# Patient Record
Sex: MALE | Race: BLACK OR AFRICAN AMERICAN | NOT HISPANIC OR LATINO | Employment: PART TIME | URBAN - METROPOLITAN AREA
[De-identification: names, ages, dates, MRNs, and addresses within clinical notes are randomized per-mention and may not be internally consistent; named-entity substitution may affect disease eponyms.]

---

## 2018-02-13 PROBLEM — K61.1 PERIRECTAL ABSCESS: Status: ACTIVE | Noted: 2018-02-13

## 2018-02-14 PROBLEM — N17.9 AKI (ACUTE KIDNEY INJURY): Status: ACTIVE | Noted: 2018-02-14

## 2018-02-14 PROBLEM — E11.9 TYPE 2 DIABETES MELLITUS WITHOUT COMPLICATION: Chronic | Status: ACTIVE | Noted: 2018-02-14

## 2018-02-15 PROBLEM — N17.9 AKI (ACUTE KIDNEY INJURY): Status: RESOLVED | Noted: 2018-02-14 | Resolved: 2018-02-15

## 2018-08-10 ENCOUNTER — HOSPITAL ENCOUNTER (EMERGENCY)
Facility: OTHER | Age: 53
Discharge: HOME | End: 2018-08-10
Attending: EMERGENCY MEDICINE
Payer: MEDICAID

## 2018-08-10 VITALS
TEMPERATURE: 99 F | WEIGHT: 191 LBS | RESPIRATION RATE: 18 BRPM | HEART RATE: 85 BPM | SYSTOLIC BLOOD PRESSURE: 151 MMHG | BODY MASS INDEX: 25.31 KG/M2 | OXYGEN SATURATION: 100 % | HEIGHT: 73 IN | DIASTOLIC BLOOD PRESSURE: 92 MMHG

## 2018-08-10 DIAGNOSIS — S61.310A LACERATION OF RIGHT INDEX FINGER WITH DAMAGE TO NAIL, FOREIGN BODY PRESENCE UNSPECIFIED, INITIAL ENCOUNTER: Primary | ICD-10-CM

## 2018-08-10 DIAGNOSIS — S62.661A CLOSED NONDISPLACED FRACTURE OF DISTAL PHALANX OF LEFT INDEX FINGER, INITIAL ENCOUNTER: ICD-10-CM

## 2018-08-10 PROCEDURE — 99283 EMERGENCY DEPT VISIT LOW MDM: CPT

## 2018-08-10 PROCEDURE — 25000003 PHARM REV CODE 250: Performed by: NURSE PRACTITIONER

## 2018-08-10 RX ORDER — HYDROCODONE BITARTRATE AND ACETAMINOPHEN 5; 325 MG/1; MG/1
1 TABLET ORAL EVERY 6 HOURS PRN
Qty: 10 TABLET | Refills: 0 | Status: SHIPPED | OUTPATIENT
Start: 2018-08-10 | End: 2021-01-28

## 2018-08-10 RX ORDER — LIDOCAINE HYDROCHLORIDE 10 MG/ML
5 INJECTION, SOLUTION EPIDURAL; INFILTRATION; INTRACAUDAL; PERINEURAL
Status: COMPLETED | OUTPATIENT
Start: 2018-08-10 | End: 2018-08-10

## 2018-08-10 RX ORDER — CEPHALEXIN 500 MG/1
500 CAPSULE ORAL 4 TIMES DAILY
Qty: 20 CAPSULE | Refills: 0 | Status: SHIPPED | OUTPATIENT
Start: 2018-08-10 | End: 2018-08-15

## 2018-08-10 RX ORDER — HYDROCODONE BITARTRATE AND ACETAMINOPHEN 5; 325 MG/1; MG/1
1 TABLET ORAL
Status: COMPLETED | OUTPATIENT
Start: 2018-08-10 | End: 2018-08-10

## 2018-08-10 RX ORDER — CEPHALEXIN 500 MG/1
500 CAPSULE ORAL
Status: COMPLETED | OUTPATIENT
Start: 2018-08-10 | End: 2018-08-10

## 2018-08-10 RX ADMIN — CEPHALEXIN 500 MG: 500 CAPSULE ORAL at 08:08

## 2018-08-10 RX ADMIN — BACITRACIN, NEOMYCIN, POLYMYXIN B 1 EACH: 400; 3.5; 5 OINTMENT TOPICAL at 08:08

## 2018-08-10 RX ADMIN — LIDOCAINE HYDROCHLORIDE 50 MG: 10 INJECTION, SOLUTION EPIDURAL; INFILTRATION; INTRACAUDAL; PERINEURAL at 07:08

## 2018-08-10 RX ADMIN — HYDROCODONE BITARTRATE AND ACETAMINOPHEN 1 TABLET: 5; 325 TABLET ORAL at 07:08

## 2018-08-11 NOTE — ED PROVIDER NOTES
Encounter Date: 8/10/2018       History     Chief Complaint   Patient presents with    Laceration     right index finger. pt was slicing vegetables at work and sliced through fingernail. Bleeding has stopped.      The patient is a 52-year-old male with a past medical history of diabetes who presents to the ED with a laceration to the tip of his right index finger through his finger nail.  Injury occurred just prior to arrival while slicing vegetables.  The patient is afebrile, non-toxic appearing, and hemodynamically stable.  Bleeding controlled at this time.  Patient is up-to-date on his tetanus vaccination.  No treatment attempted prior to arrival.      The history is provided by the patient.     Review of patient's allergies indicates:   Allergen Reactions    Shellfish containing products Hives     Past Medical History:   Diagnosis Date    Diabetes mellitus      Past Surgical History:   Procedure Laterality Date    I&D rectal abcess  02/14/2018     History reviewed. No pertinent family history.  Social History   Substance Use Topics    Smoking status: Heavy Tobacco Smoker     Packs/day: 0.50     Years: 21.00     Types: Cigarettes     Start date: 1/13/1997    Smokeless tobacco: Never Used    Alcohol use 0.6 oz/week     1 Cans of beer per week     Review of Systems   Constitutional: Negative for chills and fever.   HENT: Negative for sore throat.    Eyes: Negative for visual disturbance.   Respiratory: Negative for shortness of breath.    Cardiovascular: Negative for chest pain.   Gastrointestinal: Negative for abdominal pain, nausea and vomiting.   Genitourinary: Negative for dysuria.   Musculoskeletal: Negative for back pain and gait problem.   Skin: Positive for wound (Right index finger). Negative for rash.   Neurological: Negative for dizziness and weakness.   Hematological: Does not bruise/bleed easily.       Physical Exam     Initial Vitals [08/10/18 1855]   BP Pulse Resp Temp SpO2   129/89 95 18 98.8  °F (37.1 °C) 100 %      MAP       --         Physical Exam    Nursing note and vitals reviewed.  Constitutional: He appears well-developed and well-nourished.  Non-toxic appearance.   HENT:   Head: Normocephalic and atraumatic.   Right Ear: External ear normal.   Left Ear: External ear normal.   Nose: Nose normal.   Eyes: Conjunctivae and EOM are normal.   Neck: Full passive range of motion without pain. Neck supple.   Cardiovascular: Normal rate and normal pulses.   Pulmonary/Chest: Effort normal. No respiratory distress.   Musculoskeletal: Normal range of motion.        Right hand: He exhibits normal capillary refill. Decreased sensation is not present in the ulnar distribution, is not present in the medial distribution and is not present in the radial distribution. Right index finger: Exhibits tenderness. Injuries: laceration. There is a nailbed injury of the following fingers: index. Motor /Testing: Index Finger: 5/5.        Hands:  Neurological: He is alert and oriented to person, place, and time. He has normal strength. Gait normal.   Skin: Skin is warm and dry. Capillary refill takes less than 2 seconds. Laceration noted. No rash noted.   Psychiatric: He has a normal mood and affect. His speech is normal and behavior is normal. Judgment and thought content normal. Cognition and memory are normal.         ED Course   Procedures  Labs Reviewed - No data to display       Imaging Results    None          Medical Decision Making:   History:   Old Medical Records: I decided to obtain old medical records.  Differential Diagnosis:   Laceration  Fracture  Local infection  Neurovascular deficit  Foreign body  Clinical Tests:   Radiological Study: Ordered and Reviewed  ED Management:  Laceration noted to distal tip of right index finger with fingernail involvement.  No visible bone, tendons, ligaments noted on exam.  Range of motion and sensation are intact.  Fingernail is not loose.    Tdap up-to-date.    Wound  vigorously irrigated with normal saline and cleansed with betadine.    Radiologic evaluation demonstrates minimal cortical step-off involving the dorsal aspect of the left 2nd distal phalanx, suggestive of a nondisplaced fracture of the distal phalanx of the left index finger.  No radiopaque foreign body.    There are no signs of foreign body, neurovascular deficit, or infection at this time.  No indication for laceration repair at this time.  We will apply topical antibiotic ointment and re-dress.  In light of possible fracture and the patient's history of diabetes, the patient will be discharged on a short course of Keflex and instructed to follow up with primary care.  The patient has been given specific return precautions and referred to primary care for follow up.  Case discussed with supervising physician who agrees with plan.                      Clinical Impression:   The primary encounter diagnosis was Laceration of right index finger with damage to nail, foreign body presence unspecified, initial encounter. A diagnosis of Closed nondisplaced fracture of distal phalanx of left index finger, initial encounter was also pertinent to this visit.                             Nilda De Santiago NP  08/10/18 [id number]

## 2018-08-11 NOTE — ED TRIAGE NOTES
"Pt presents to ED with c/o laceration to left index finger. Pt states " I was cutting vegetables at work and the knife went right through my nail". No active bleeding noted upon assessment, no open wound. Slight dry drainage around nail. Pt is updated on tetanus. Pt denies loss of sensation/tingling in left hand     "

## 2021-01-28 ENCOUNTER — TELEPHONE (OUTPATIENT)
Dept: SURGERY | Facility: CLINIC | Age: 56
End: 2021-01-28

## 2021-01-28 ENCOUNTER — OFFICE VISIT (OUTPATIENT)
Dept: PRIMARY CARE CLINIC | Facility: CLINIC | Age: 56
End: 2021-01-28
Payer: MEDICAID

## 2021-01-28 ENCOUNTER — LAB VISIT (OUTPATIENT)
Dept: PRIMARY CARE CLINIC | Facility: CLINIC | Age: 56
End: 2021-01-28
Payer: MEDICAID

## 2021-01-28 VITALS
WEIGHT: 175.5 LBS | BODY MASS INDEX: 23.26 KG/M2 | OXYGEN SATURATION: 99 % | HEIGHT: 73 IN | DIASTOLIC BLOOD PRESSURE: 88 MMHG | TEMPERATURE: 98 F | HEART RATE: 83 BPM | SYSTOLIC BLOOD PRESSURE: 138 MMHG

## 2021-01-28 DIAGNOSIS — H35.61 RETINAL HEMORRHAGE OF RIGHT EYE: Primary | ICD-10-CM

## 2021-01-28 DIAGNOSIS — Z23 ENCOUNTER FOR ADMINISTRATION OF VACCINE: ICD-10-CM

## 2021-01-28 DIAGNOSIS — Z72.0 TOBACCO USE: ICD-10-CM

## 2021-01-28 DIAGNOSIS — G89.29 CHRONIC PAIN OF LEFT KNEE: ICD-10-CM

## 2021-01-28 DIAGNOSIS — Z12.11 COLON CANCER SCREENING: ICD-10-CM

## 2021-01-28 DIAGNOSIS — Z00.00 ANNUAL PHYSICAL EXAM: ICD-10-CM

## 2021-01-28 DIAGNOSIS — E11.9 TYPE 2 DIABETES MELLITUS WITHOUT COMPLICATION, WITHOUT LONG-TERM CURRENT USE OF INSULIN: ICD-10-CM

## 2021-01-28 DIAGNOSIS — M25.562 CHRONIC PAIN OF LEFT KNEE: ICD-10-CM

## 2021-01-28 LAB
ALBUMIN SERPL BCP-MCNC: 3.8 G/DL (ref 3.5–5.2)
ALP SERPL-CCNC: 85 U/L (ref 55–135)
ALT SERPL W/O P-5'-P-CCNC: 13 U/L (ref 10–44)
ANION GAP SERPL CALC-SCNC: 10 MMOL/L (ref 8–16)
AST SERPL-CCNC: 18 U/L (ref 10–40)
BASOPHILS # BLD AUTO: 0.03 K/UL (ref 0–0.2)
BASOPHILS NFR BLD: 0.6 % (ref 0–1.9)
BILIRUB SERPL-MCNC: 0.4 MG/DL (ref 0.1–1)
BUN SERPL-MCNC: 13 MG/DL (ref 6–20)
CALCIUM SERPL-MCNC: 9.5 MG/DL (ref 8.7–10.5)
CHLORIDE SERPL-SCNC: 101 MMOL/L (ref 95–110)
CHOLEST SERPL-MCNC: 171 MG/DL (ref 120–199)
CHOLEST/HDLC SERPL: 3.4 {RATIO} (ref 2–5)
CO2 SERPL-SCNC: 26 MMOL/L (ref 23–29)
CREAT SERPL-MCNC: 1.4 MG/DL (ref 0.5–1.4)
DIFFERENTIAL METHOD: ABNORMAL
EOSINOPHIL # BLD AUTO: 0.2 K/UL (ref 0–0.5)
EOSINOPHIL NFR BLD: 4 % (ref 0–8)
ERYTHROCYTE [DISTWIDTH] IN BLOOD BY AUTOMATED COUNT: 13.3 % (ref 11.5–14.5)
EST. GFR  (AFRICAN AMERICAN): >60 ML/MIN/1.73 M^2
EST. GFR  (NON AFRICAN AMERICAN): 56.6 ML/MIN/1.73 M^2
ESTIMATED AVG GLUCOSE: 237 MG/DL (ref 68–131)
GLUCOSE SERPL-MCNC: 347 MG/DL (ref 70–110)
HBA1C MFR BLD: 9.9 % (ref 4–5.6)
HCT VFR BLD AUTO: 40.6 % (ref 40–54)
HDLC SERPL-MCNC: 50 MG/DL (ref 40–75)
HDLC SERPL: 29.2 % (ref 20–50)
HGB BLD-MCNC: 13.2 G/DL (ref 14–18)
IMM GRANULOCYTES # BLD AUTO: 0.01 K/UL (ref 0–0.04)
IMM GRANULOCYTES NFR BLD AUTO: 0.2 % (ref 0–0.5)
LDLC SERPL CALC-MCNC: 99.6 MG/DL (ref 63–159)
LYMPHOCYTES # BLD AUTO: 1.6 K/UL (ref 1–4.8)
LYMPHOCYTES NFR BLD: 32.8 % (ref 18–48)
MCH RBC QN AUTO: 29.3 PG (ref 27–31)
MCHC RBC AUTO-ENTMCNC: 32.5 G/DL (ref 32–36)
MCV RBC AUTO: 90 FL (ref 82–98)
MONOCYTES # BLD AUTO: 0.3 K/UL (ref 0.3–1)
MONOCYTES NFR BLD: 6.5 % (ref 4–15)
NEUTROPHILS # BLD AUTO: 2.7 K/UL (ref 1.8–7.7)
NEUTROPHILS NFR BLD: 55.9 % (ref 38–73)
NONHDLC SERPL-MCNC: 121 MG/DL
NRBC BLD-RTO: 0 /100 WBC
PLATELET # BLD AUTO: 245 K/UL (ref 150–350)
PMV BLD AUTO: 10.6 FL (ref 9.2–12.9)
POTASSIUM SERPL-SCNC: 5 MMOL/L (ref 3.5–5.1)
PROT SERPL-MCNC: 7.7 G/DL (ref 6–8.4)
RBC # BLD AUTO: 4.51 M/UL (ref 4.6–6.2)
SODIUM SERPL-SCNC: 137 MMOL/L (ref 136–145)
TRIGL SERPL-MCNC: 107 MG/DL (ref 30–150)
WBC # BLD AUTO: 4.76 K/UL (ref 3.9–12.7)

## 2021-01-28 PROCEDURE — 36415 COLL VENOUS BLD VENIPUNCTURE: CPT

## 2021-01-28 PROCEDURE — 90686 IIV4 VACC NO PRSV 0.5 ML IM: CPT | Mod: PBBFAC,PN

## 2021-01-28 PROCEDURE — 36415 COLL VENOUS BLD VENIPUNCTURE: CPT | Mod: PBBFAC,PN | Performed by: FAMILY MEDICINE

## 2021-01-28 PROCEDURE — 99999 PR PBB SHADOW E&M-EST. PATIENT-LVL IV: ICD-10-PCS | Mod: PBBFAC,,, | Performed by: FAMILY MEDICINE

## 2021-01-28 PROCEDURE — 85025 COMPLETE CBC W/AUTO DIFF WBC: CPT

## 2021-01-28 PROCEDURE — 83036 HEMOGLOBIN GLYCOSYLATED A1C: CPT

## 2021-01-28 PROCEDURE — 90471 IMMUNIZATION ADMIN: CPT | Mod: PBBFAC,PN

## 2021-01-28 PROCEDURE — 99214 PR OFFICE/OUTPT VISIT, EST, LEVL IV, 30-39 MIN: ICD-10-PCS | Mod: S$PBB,,, | Performed by: FAMILY MEDICINE

## 2021-01-28 PROCEDURE — 99214 OFFICE O/P EST MOD 30 MIN: CPT | Mod: S$PBB,,, | Performed by: FAMILY MEDICINE

## 2021-01-28 PROCEDURE — 80053 COMPREHEN METABOLIC PANEL: CPT

## 2021-01-28 PROCEDURE — 99214 OFFICE O/P EST MOD 30 MIN: CPT | Mod: PBBFAC,PN | Performed by: FAMILY MEDICINE

## 2021-01-28 PROCEDURE — 80061 LIPID PANEL: CPT

## 2021-01-28 PROCEDURE — 99999 PR PBB SHADOW E&M-EST. PATIENT-LVL IV: CPT | Mod: PBBFAC,,, | Performed by: FAMILY MEDICINE

## 2021-01-29 DIAGNOSIS — Z12.11 SCREENING FOR COLON CANCER: Primary | ICD-10-CM

## 2021-01-29 RX ORDER — PEN NEEDLE, DIABETIC 30 GX3/16"
1 NEEDLE, DISPOSABLE MISCELLANEOUS DAILY
Qty: 100 EACH | Refills: 1 | Status: SHIPPED | OUTPATIENT
Start: 2021-01-29 | End: 2021-02-01 | Stop reason: SDUPTHER

## 2021-01-29 RX ORDER — SODIUM CHLORIDE 0.9 % (FLUSH) 0.9 %
3 SYRINGE (ML) INJECTION
Status: CANCELLED | OUTPATIENT
Start: 2021-01-29

## 2021-01-29 RX ORDER — INSULIN PUMP SYRINGE, 3 ML
EACH MISCELLANEOUS
Qty: 1 EACH | Refills: 0 | Status: SHIPPED | OUTPATIENT
Start: 2021-01-29 | End: 2021-02-01 | Stop reason: SDUPTHER

## 2021-01-29 RX ORDER — LANCETS
EACH MISCELLANEOUS
Qty: 100 EACH | Refills: 2 | Status: SHIPPED | OUTPATIENT
Start: 2021-01-29 | End: 2021-02-01 | Stop reason: SDUPTHER

## 2021-01-29 RX ORDER — SODIUM, POTASSIUM,MAG SULFATES 17.5-3.13G
1 SOLUTION, RECONSTITUTED, ORAL ORAL DAILY
Qty: 1 KIT | Refills: 0 | Status: SHIPPED | OUTPATIENT
Start: 2021-01-29 | End: 2021-01-31

## 2021-01-29 RX ORDER — INSULIN GLARGINE 100 [IU]/ML
18 INJECTION, SOLUTION SUBCUTANEOUS DAILY
Qty: 6 ML | Refills: 2 | Status: SHIPPED | OUTPATIENT
Start: 2021-01-29 | End: 2021-02-01 | Stop reason: SDUPTHER

## 2021-01-29 RX ORDER — METFORMIN HYDROCHLORIDE 500 MG/1
1000 TABLET, EXTENDED RELEASE ORAL 2 TIMES DAILY WITH MEALS
Qty: 360 TABLET | Refills: 3 | Status: SHIPPED | OUTPATIENT
Start: 2021-01-29 | End: 2021-02-01 | Stop reason: SDUPTHER

## 2021-01-29 RX ORDER — ATORVASTATIN CALCIUM 40 MG/1
40 TABLET, FILM COATED ORAL DAILY
Qty: 90 TABLET | Refills: 3 | Status: SHIPPED | OUTPATIENT
Start: 2021-01-29 | End: 2021-02-01 | Stop reason: SDUPTHER

## 2021-02-01 RX ORDER — INSULIN GLARGINE 100 [IU]/ML
18 INJECTION, SOLUTION SUBCUTANEOUS DAILY
Qty: 6 ML | Refills: 2 | Status: SHIPPED | OUTPATIENT
Start: 2021-02-01 | End: 2021-04-29

## 2021-02-01 RX ORDER — LANCETS
EACH MISCELLANEOUS
Qty: 100 EACH | Refills: 2 | Status: SHIPPED | OUTPATIENT
Start: 2021-02-01

## 2021-02-01 RX ORDER — METFORMIN HYDROCHLORIDE 500 MG/1
1000 TABLET, EXTENDED RELEASE ORAL 2 TIMES DAILY WITH MEALS
Qty: 360 TABLET | Refills: 3 | Status: SHIPPED | OUTPATIENT
Start: 2021-02-01 | End: 2021-09-16 | Stop reason: SDUPTHER

## 2021-02-01 RX ORDER — INSULIN PUMP SYRINGE, 3 ML
EACH MISCELLANEOUS
Qty: 1 EACH | Refills: 0 | Status: SHIPPED | OUTPATIENT
Start: 2021-02-01 | End: 2022-02-01

## 2021-02-01 RX ORDER — PEN NEEDLE, DIABETIC 30 GX3/16"
1 NEEDLE, DISPOSABLE MISCELLANEOUS DAILY
Qty: 100 EACH | Refills: 1 | Status: SHIPPED | OUTPATIENT
Start: 2021-02-01 | End: 2021-09-16 | Stop reason: SDUPTHER

## 2021-02-01 RX ORDER — ATORVASTATIN CALCIUM 40 MG/1
40 TABLET, FILM COATED ORAL DAILY
Qty: 90 TABLET | Refills: 3 | Status: SHIPPED | OUTPATIENT
Start: 2021-02-01 | End: 2021-09-16 | Stop reason: SDUPTHER

## 2021-02-08 ENCOUNTER — TELEPHONE (OUTPATIENT)
Dept: PRIMARY CARE CLINIC | Facility: CLINIC | Age: 56
End: 2021-02-08

## 2021-02-25 ENCOUNTER — TELEPHONE (OUTPATIENT)
Dept: ORTHOPEDICS | Facility: CLINIC | Age: 56
End: 2021-02-25

## 2021-02-26 ENCOUNTER — TELEPHONE (OUTPATIENT)
Dept: ORTHOPEDICS | Facility: CLINIC | Age: 56
End: 2021-02-26

## 2021-03-08 ENCOUNTER — TELEPHONE (OUTPATIENT)
Dept: PRIMARY CARE CLINIC | Facility: CLINIC | Age: 56
End: 2021-03-08

## 2021-03-09 ENCOUNTER — CLINICAL SUPPORT (OUTPATIENT)
Dept: PRIMARY CARE CLINIC | Facility: CLINIC | Age: 56
End: 2021-03-09
Payer: MEDICAID

## 2021-03-09 DIAGNOSIS — E11.9 TYPE 2 DIABETES MELLITUS WITHOUT COMPLICATION, WITHOUT LONG-TERM CURRENT USE OF INSULIN: Primary | ICD-10-CM

## 2021-03-11 DIAGNOSIS — Z12.11 SCREENING FOR MALIGNANT NEOPLASM OF COLON: Primary | ICD-10-CM

## 2021-03-31 RX ORDER — LANCETS 30 GAUGE
EACH MISCELLANEOUS 3 TIMES DAILY
COMMUNITY
Start: 2021-02-01 | End: 2021-09-24 | Stop reason: SDUPTHER

## 2021-03-31 RX ORDER — PENICILLIN V POTASSIUM 500 MG/1
500 TABLET, FILM COATED ORAL EVERY 6 HOURS
COMMUNITY
Start: 2021-03-16 | End: 2021-09-16

## 2021-04-29 ENCOUNTER — TELEPHONE (OUTPATIENT)
Dept: PRIMARY CARE CLINIC | Facility: CLINIC | Age: 56
End: 2021-04-29

## 2021-04-29 DIAGNOSIS — E11.9 TYPE 2 DIABETES MELLITUS WITHOUT COMPLICATION, WITHOUT LONG-TERM CURRENT USE OF INSULIN: Primary | ICD-10-CM

## 2021-04-29 RX ORDER — INSULIN GLARGINE 100 [IU]/ML
INJECTION, SOLUTION SUBCUTANEOUS
Qty: 6 ML | Refills: 2 | Status: SHIPPED | OUTPATIENT
Start: 2021-04-29 | End: 2021-08-17

## 2021-06-10 ENCOUNTER — PATIENT OUTREACH (OUTPATIENT)
Dept: ADMINISTRATIVE | Facility: HOSPITAL | Age: 56
End: 2021-06-10

## 2021-06-24 ENCOUNTER — OFFICE VISIT (OUTPATIENT)
Dept: PRIMARY CARE CLINIC | Facility: CLINIC | Age: 56
End: 2021-06-24
Payer: MEDICAID

## 2021-06-24 ENCOUNTER — LAB VISIT (OUTPATIENT)
Dept: PRIMARY CARE CLINIC | Facility: CLINIC | Age: 56
End: 2021-06-24
Payer: MEDICAID

## 2021-06-24 VITALS
OXYGEN SATURATION: 96 % | SYSTOLIC BLOOD PRESSURE: 152 MMHG | DIASTOLIC BLOOD PRESSURE: 90 MMHG | WEIGHT: 172.5 LBS | HEIGHT: 73 IN | HEART RATE: 88 BPM | BODY MASS INDEX: 22.86 KG/M2

## 2021-06-24 DIAGNOSIS — E11.9 TYPE 2 DIABETES MELLITUS WITHOUT COMPLICATION, WITHOUT LONG-TERM CURRENT USE OF INSULIN: Primary | ICD-10-CM

## 2021-06-24 DIAGNOSIS — E11.9 TYPE 2 DIABETES MELLITUS WITHOUT COMPLICATION, UNSPECIFIED WHETHER LONG TERM INSULIN USE: ICD-10-CM

## 2021-06-24 DIAGNOSIS — M54.50 ACUTE RIGHT-SIDED LOW BACK PAIN WITHOUT SCIATICA: ICD-10-CM

## 2021-06-24 DIAGNOSIS — R03.0 ELEVATED BLOOD PRESSURE READING WITHOUT DIAGNOSIS OF HYPERTENSION: ICD-10-CM

## 2021-06-24 DIAGNOSIS — E78.5 HYPERLIPIDEMIA, UNSPECIFIED HYPERLIPIDEMIA TYPE: ICD-10-CM

## 2021-06-24 DIAGNOSIS — E11.9 TYPE 2 DIABETES MELLITUS WITHOUT COMPLICATION: ICD-10-CM

## 2021-06-24 DIAGNOSIS — E11.9 TYPE 2 DIABETES MELLITUS WITHOUT COMPLICATION, WITHOUT LONG-TERM CURRENT USE OF INSULIN: ICD-10-CM

## 2021-06-24 DIAGNOSIS — N52.9 ERECTILE DYSFUNCTION, UNSPECIFIED ERECTILE DYSFUNCTION TYPE: ICD-10-CM

## 2021-06-24 PROCEDURE — 99999 PR PBB SHADOW E&M-EST. PATIENT-LVL IV: ICD-10-PCS | Mod: PBBFAC,,, | Performed by: FAMILY MEDICINE

## 2021-06-24 PROCEDURE — 99214 OFFICE O/P EST MOD 30 MIN: CPT | Mod: PBBFAC,PN | Performed by: FAMILY MEDICINE

## 2021-06-24 PROCEDURE — 99999 PR PBB SHADOW E&M-EST. PATIENT-LVL IV: CPT | Mod: PBBFAC,,, | Performed by: FAMILY MEDICINE

## 2021-06-24 PROCEDURE — 80061 LIPID PANEL: CPT | Performed by: FAMILY MEDICINE

## 2021-06-24 PROCEDURE — 99214 OFFICE O/P EST MOD 30 MIN: CPT | Mod: S$PBB,,, | Performed by: FAMILY MEDICINE

## 2021-06-24 PROCEDURE — 99214 PR OFFICE/OUTPT VISIT, EST, LEVL IV, 30-39 MIN: ICD-10-PCS | Mod: S$PBB,,, | Performed by: FAMILY MEDICINE

## 2021-06-24 PROCEDURE — 83036 HEMOGLOBIN GLYCOSYLATED A1C: CPT | Performed by: FAMILY MEDICINE

## 2021-06-24 PROCEDURE — 36415 COLL VENOUS BLD VENIPUNCTURE: CPT | Mod: PBBFAC,PN

## 2021-06-24 RX ORDER — NEBULIZER AND COMPRESSOR
1 EACH MISCELLANEOUS DAILY
Qty: 1 EACH | Refills: 0 | Status: SHIPPED | OUTPATIENT
Start: 2021-06-24

## 2021-06-24 RX ORDER — NAPROXEN 500 MG/1
500 TABLET ORAL 2 TIMES DAILY PRN
Qty: 30 TABLET | Refills: 1 | Status: SHIPPED | OUTPATIENT
Start: 2021-06-24

## 2021-06-24 RX ORDER — SILDENAFIL 50 MG/1
50 TABLET, FILM COATED ORAL DAILY PRN
Qty: 30 TABLET | Refills: 3 | Status: SHIPPED | OUTPATIENT
Start: 2021-06-24

## 2021-06-25 LAB
CHOLEST SERPL-MCNC: 141 MG/DL (ref 120–199)
CHOLEST/HDLC SERPL: 3.1 {RATIO} (ref 2–5)
ESTIMATED AVG GLUCOSE: 220 MG/DL (ref 68–131)
HBA1C MFR BLD: 9.3 % (ref 4–5.6)
HDLC SERPL-MCNC: 46 MG/DL (ref 40–75)
HDLC SERPL: 32.6 % (ref 20–50)
LDLC SERPL CALC-MCNC: 79.2 MG/DL (ref 63–159)
NONHDLC SERPL-MCNC: 95 MG/DL
TRIGL SERPL-MCNC: 79 MG/DL (ref 30–150)

## 2021-07-01 ENCOUNTER — CLINICAL SUPPORT (OUTPATIENT)
Dept: PRIMARY CARE CLINIC | Facility: CLINIC | Age: 56
End: 2021-07-01
Payer: MEDICAID

## 2021-07-01 VITALS — HEART RATE: 75 BPM | DIASTOLIC BLOOD PRESSURE: 90 MMHG | SYSTOLIC BLOOD PRESSURE: 140 MMHG | OXYGEN SATURATION: 99 %

## 2021-07-01 DIAGNOSIS — I10 ESSENTIAL HYPERTENSION: Primary | ICD-10-CM

## 2021-07-01 PROCEDURE — 99212 OFFICE O/P EST SF 10 MIN: CPT | Mod: PBBFAC,PN

## 2021-07-01 PROCEDURE — 99999 PR PBB SHADOW E&M-EST. PATIENT-LVL II: CPT | Mod: PBBFAC,,,

## 2021-07-01 PROCEDURE — 99999 PR PBB SHADOW E&M-EST. PATIENT-LVL II: ICD-10-PCS | Mod: PBBFAC,,,

## 2021-07-01 RX ORDER — AMLODIPINE BESYLATE 5 MG/1
5 TABLET ORAL DAILY
Qty: 90 TABLET | Refills: 0 | Status: SHIPPED | OUTPATIENT
Start: 2021-07-01 | End: 2021-09-16 | Stop reason: SDUPTHER

## 2021-07-07 ENCOUNTER — PATIENT OUTREACH (OUTPATIENT)
Dept: ADMINISTRATIVE | Facility: HOSPITAL | Age: 56
End: 2021-07-07

## 2021-09-16 ENCOUNTER — TELEPHONE (OUTPATIENT)
Dept: PRIMARY CARE CLINIC | Facility: CLINIC | Age: 56
End: 2021-09-16

## 2021-09-16 RX ORDER — INSULIN GLARGINE 100 [IU]/ML
INJECTION, SOLUTION SUBCUTANEOUS
Qty: 6 ML | Refills: 2 | Status: SHIPPED | OUTPATIENT
Start: 2021-09-16

## 2021-09-16 RX ORDER — PEN NEEDLE, DIABETIC 30 GX3/16"
1 NEEDLE, DISPOSABLE MISCELLANEOUS DAILY
Qty: 100 EACH | Refills: 1 | Status: SHIPPED | OUTPATIENT
Start: 2021-09-16

## 2021-09-16 RX ORDER — ATORVASTATIN CALCIUM 40 MG/1
40 TABLET, FILM COATED ORAL NIGHTLY
Qty: 90 TABLET | Refills: 3 | Status: SHIPPED | OUTPATIENT
Start: 2021-09-16 | End: 2022-09-16

## 2021-09-16 RX ORDER — METFORMIN HYDROCHLORIDE 500 MG/1
1000 TABLET, EXTENDED RELEASE ORAL 2 TIMES DAILY WITH MEALS
Qty: 180 TABLET | Refills: 6 | Status: SHIPPED | OUTPATIENT
Start: 2021-09-16

## 2021-09-16 RX ORDER — AMLODIPINE BESYLATE 5 MG/1
5 TABLET ORAL DAILY
Qty: 90 TABLET | Refills: 0 | Status: SHIPPED | OUTPATIENT
Start: 2021-09-16 | End: 2021-12-20

## 2021-09-24 ENCOUNTER — TELEPHONE (OUTPATIENT)
Dept: PRIMARY CARE CLINIC | Facility: CLINIC | Age: 56
End: 2021-09-24

## 2021-09-24 DIAGNOSIS — E11.9 TYPE 2 DIABETES MELLITUS WITHOUT COMPLICATION, WITHOUT LONG-TERM CURRENT USE OF INSULIN: Primary | ICD-10-CM

## 2021-09-24 RX ORDER — LANCETS 30 GAUGE
1 EACH MISCELLANEOUS 3 TIMES DAILY
Qty: 1 EACH | Refills: 0 | Status: SHIPPED | OUTPATIENT
Start: 2021-09-24

## 2021-10-06 DIAGNOSIS — E11.9 TYPE 2 DIABETES MELLITUS WITHOUT COMPLICATION: ICD-10-CM

## 2022-03-15 ENCOUNTER — PATIENT OUTREACH (OUTPATIENT)
Dept: ADMINISTRATIVE | Facility: HOSPITAL | Age: 57
End: 2022-03-15
Payer: MEDICAID

## 2024-04-25 ENCOUNTER — OFFICE VISIT (OUTPATIENT)
Dept: URGENT CARE | Facility: CLINIC | Age: 59
End: 2024-04-25
Payer: COMMERCIAL

## 2024-04-25 VITALS
DIASTOLIC BLOOD PRESSURE: 111 MMHG | TEMPERATURE: 99 F | SYSTOLIC BLOOD PRESSURE: 178 MMHG | RESPIRATION RATE: 20 BRPM | WEIGHT: 172 LBS | HEART RATE: 105 BPM | OXYGEN SATURATION: 100 % | HEIGHT: 73 IN | BODY MASS INDEX: 22.8 KG/M2

## 2024-04-25 DIAGNOSIS — I10 UNCONTROLLED HYPERTENSION: Primary | ICD-10-CM

## 2024-04-25 DIAGNOSIS — R07.89 CHEST WALL PAIN: ICD-10-CM

## 2024-04-25 DIAGNOSIS — E11.65 UNCONTROLLED TYPE 2 DIABETES MELLITUS WITH HYPERGLYCEMIA: ICD-10-CM

## 2024-04-25 LAB
BILIRUB UR QL STRIP: NEGATIVE
GLUCOSE SERPL-MCNC: 198 MG/DL (ref 70–110)
GLUCOSE UR QL STRIP: POSITIVE
KETONES UR QL STRIP: NEGATIVE
LEUKOCYTE ESTERASE UR QL STRIP: NEGATIVE
OHS QRS DURATION: 86 MS
OHS QTC CALCULATION: 437 MS
PH, POC UA: 5.5 (ref 5–8)
POC BLOOD, URINE: NEGATIVE
POC NITRATES, URINE: NEGATIVE
PROT UR QL STRIP: POSITIVE
SP GR UR STRIP: 1.02 (ref 1–1.03)
UROBILINOGEN UR STRIP-ACNC: ABNORMAL (ref 0.3–2.2)

## 2024-04-25 PROCEDURE — 93010 ELECTROCARDIOGRAM REPORT: CPT | Mod: S$GLB,,, | Performed by: INTERNAL MEDICINE

## 2024-04-25 PROCEDURE — 82962 GLUCOSE BLOOD TEST: CPT | Mod: S$GLB,,,

## 2024-04-25 PROCEDURE — 99204 OFFICE O/P NEW MOD 45 MIN: CPT | Mod: S$GLB,,,

## 2024-04-25 PROCEDURE — 93005 ELECTROCARDIOGRAM TRACING: CPT | Mod: S$GLB,,,

## 2024-04-25 PROCEDURE — 81003 URINALYSIS AUTO W/O SCOPE: CPT | Mod: QW,S$GLB,,

## 2024-04-25 RX ORDER — AMLODIPINE BESYLATE 5 MG/1
5 TABLET ORAL DAILY
Qty: 30 TABLET | Refills: 0 | Status: SHIPPED | OUTPATIENT
Start: 2024-04-25 | End: 2024-04-29 | Stop reason: SDUPTHER

## 2024-04-25 NOTE — PATIENT INSTRUCTIONS
Appointment with internal medicine scheduled for Monday, April 29, 2024 at 10:00 AM at [facility] location on [facility].     Amlodipine 5 mg sent to pharmacy. This is a blood pressure medication. Please start taking as prescribed.   Keep follow up appointment with [facility] next Wednesday for continued management of your arm fracture.  Continue current treatment regimen for your fracture as directed by previous provider.    Go to the ED if  You have signs of a heart attack, which may include:  Severe chest pain, pressure, or discomfort with:  Breathing trouble, sweating, upset stomach, or cold, clammy skin  Pain in your arms, back, or jaw  Worse pain with activity like walking up stairs  Fast or irregular heartbeat  Feeling dizzy, faint, or weak  You have signs of stroke like sudden:  Numbness or weakness of the face, arm, or leg, especially on one side of the body  Confusion, trouble speaking, or understanding  Trouble seeing in one or both eyes  Trouble walking, dizziness, loss of balance, or coordination  Severe headache with no known cause  You have a seizure or pass out.  You have a severe headache with an upset stomach or throwing up.  You have sudden, severe back pain.  You have 2 home blood pressure readings higher than 180/120.  Your urine is brown or bloody.  You have signs of high blood sugar like you:  Are more thirsty  Are urinating more often  Have new shortness of breath  Have belly pain, an upset stomach, or are throwing up.  Are more tired than normal  Have a very dry mouth or a fruity breath odor  Signs of infection. These include a fever of 100.4°F (38°C) or higher, chills, or a wound that will not heal.  Blurred vision  Chest pain  Swelling in your legs  Change in color and odor of your feet  Blood sugar that remains high and does not respond to treatment  Should you develop any worsening or new symptoms after leaving urgent care, it is recommended that you go to the ER for  further/repeat evaluation.   Follow up with your PCP in 3-5 days after your urgent care visit.  Please remember that you have received care at an urgent care today. Urgent cares are not emergency rooms and are not equipped to handle life threatening emergencies and cannot rule in or out certain medical conditions and you may be released before all of your medical problems are known or treated, please schedule all follow up appointments as discussed and if you have worsening symptoms please go to the ER to rule out potential life threatening problems, as discussed.
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
## 2024-04-29 ENCOUNTER — LAB VISIT (OUTPATIENT)
Dept: LAB | Facility: HOSPITAL | Age: 59
End: 2024-04-29
Attending: STUDENT IN AN ORGANIZED HEALTH CARE EDUCATION/TRAINING PROGRAM
Payer: COMMERCIAL

## 2024-04-29 ENCOUNTER — OFFICE VISIT (OUTPATIENT)
Dept: PRIMARY CARE CLINIC | Facility: CLINIC | Age: 59
End: 2024-04-29
Payer: COMMERCIAL

## 2024-04-29 VITALS
SYSTOLIC BLOOD PRESSURE: 136 MMHG | BODY MASS INDEX: 20.83 KG/M2 | RESPIRATION RATE: 16 BRPM | TEMPERATURE: 98 F | WEIGHT: 157.19 LBS | OXYGEN SATURATION: 99 % | HEIGHT: 73 IN | HEART RATE: 104 BPM | DIASTOLIC BLOOD PRESSURE: 90 MMHG

## 2024-04-29 DIAGNOSIS — I10 UNCONTROLLED HYPERTENSION: ICD-10-CM

## 2024-04-29 DIAGNOSIS — Z79.4 TYPE 2 DIABETES MELLITUS WITHOUT COMPLICATION, WITH LONG-TERM CURRENT USE OF INSULIN: ICD-10-CM

## 2024-04-29 DIAGNOSIS — Z00.00 ANNUAL PHYSICAL EXAM: ICD-10-CM

## 2024-04-29 DIAGNOSIS — E11.9 TYPE 2 DIABETES MELLITUS WITHOUT COMPLICATION, WITH LONG-TERM CURRENT USE OF INSULIN: ICD-10-CM

## 2024-04-29 DIAGNOSIS — Z00.00 ANNUAL PHYSICAL EXAM: Primary | ICD-10-CM

## 2024-04-29 LAB
ALBUMIN SERPL BCP-MCNC: 3.6 G/DL (ref 3.5–5.2)
ALP SERPL-CCNC: 126 U/L (ref 55–135)
ALT SERPL W/O P-5'-P-CCNC: 23 U/L (ref 10–44)
ANION GAP SERPL CALC-SCNC: 13 MMOL/L (ref 8–16)
AST SERPL-CCNC: 30 U/L (ref 10–40)
BASOPHILS # BLD AUTO: 0.02 K/UL (ref 0–0.2)
BASOPHILS NFR BLD: 0.3 % (ref 0–1.9)
BILIRUB SERPL-MCNC: 0.7 MG/DL (ref 0.1–1)
BUN SERPL-MCNC: 23 MG/DL (ref 6–20)
CALCIUM SERPL-MCNC: 9.9 MG/DL (ref 8.7–10.5)
CHLORIDE SERPL-SCNC: 96 MMOL/L (ref 95–110)
CHOLEST SERPL-MCNC: 178 MG/DL (ref 120–199)
CHOLEST/HDLC SERPL: 3.6 {RATIO} (ref 2–5)
CO2 SERPL-SCNC: 24 MMOL/L (ref 23–29)
COMPLEXED PSA SERPL-MCNC: 1.7 NG/ML (ref 0–4)
CREAT SERPL-MCNC: 1.6 MG/DL (ref 0.5–1.4)
DIFFERENTIAL METHOD BLD: ABNORMAL
EOSINOPHIL # BLD AUTO: 0.2 K/UL (ref 0–0.5)
EOSINOPHIL NFR BLD: 2.5 % (ref 0–8)
ERYTHROCYTE [DISTWIDTH] IN BLOOD BY AUTOMATED COUNT: 12.5 % (ref 11.5–14.5)
EST. GFR  (NO RACE VARIABLE): 49.9 ML/MIN/1.73 M^2
ESTIMATED AVG GLUCOSE: 194 MG/DL (ref 68–131)
GLUCOSE SERPL-MCNC: 240 MG/DL (ref 70–110)
HBA1C MFR BLD: 8.4 % (ref 4–5.6)
HCT VFR BLD AUTO: 40.5 % (ref 40–54)
HDLC SERPL-MCNC: 49 MG/DL (ref 40–75)
HDLC SERPL: 27.5 % (ref 20–50)
HGB BLD-MCNC: 13.8 G/DL (ref 14–18)
IMM GRANULOCYTES # BLD AUTO: 0.02 K/UL (ref 0–0.04)
IMM GRANULOCYTES NFR BLD AUTO: 0.3 % (ref 0–0.5)
LDLC SERPL CALC-MCNC: 113.8 MG/DL (ref 63–159)
LYMPHOCYTES # BLD AUTO: 1.4 K/UL (ref 1–4.8)
LYMPHOCYTES NFR BLD: 21.4 % (ref 18–48)
MCH RBC QN AUTO: 30.4 PG (ref 27–31)
MCHC RBC AUTO-ENTMCNC: 34.1 G/DL (ref 32–36)
MCV RBC AUTO: 89 FL (ref 82–98)
MONOCYTES # BLD AUTO: 0.6 K/UL (ref 0.3–1)
MONOCYTES NFR BLD: 8.8 % (ref 4–15)
NEUTROPHILS # BLD AUTO: 4.3 K/UL (ref 1.8–7.7)
NEUTROPHILS NFR BLD: 66.7 % (ref 38–73)
NONHDLC SERPL-MCNC: 129 MG/DL
NRBC BLD-RTO: 0 /100 WBC
PLATELET # BLD AUTO: 319 K/UL (ref 150–450)
PMV BLD AUTO: 10 FL (ref 9.2–12.9)
POTASSIUM SERPL-SCNC: 4.7 MMOL/L (ref 3.5–5.1)
PROT SERPL-MCNC: 8.4 G/DL (ref 6–8.4)
RBC # BLD AUTO: 4.54 M/UL (ref 4.6–6.2)
SODIUM SERPL-SCNC: 133 MMOL/L (ref 136–145)
TRIGL SERPL-MCNC: 76 MG/DL (ref 30–150)
TSH SERPL DL<=0.005 MIU/L-ACNC: 3.16 UIU/ML (ref 0.4–4)
WBC # BLD AUTO: 6.37 K/UL (ref 3.9–12.7)

## 2024-04-29 PROCEDURE — 80053 COMPREHEN METABOLIC PANEL: CPT | Performed by: STUDENT IN AN ORGANIZED HEALTH CARE EDUCATION/TRAINING PROGRAM

## 2024-04-29 PROCEDURE — 3075F SYST BP GE 130 - 139MM HG: CPT | Mod: CPTII,S$GLB,, | Performed by: STUDENT IN AN ORGANIZED HEALTH CARE EDUCATION/TRAINING PROGRAM

## 2024-04-29 PROCEDURE — 80061 LIPID PANEL: CPT | Performed by: STUDENT IN AN ORGANIZED HEALTH CARE EDUCATION/TRAINING PROGRAM

## 2024-04-29 PROCEDURE — 84443 ASSAY THYROID STIM HORMONE: CPT | Performed by: STUDENT IN AN ORGANIZED HEALTH CARE EDUCATION/TRAINING PROGRAM

## 2024-04-29 PROCEDURE — 3008F BODY MASS INDEX DOCD: CPT | Mod: CPTII,S$GLB,, | Performed by: STUDENT IN AN ORGANIZED HEALTH CARE EDUCATION/TRAINING PROGRAM

## 2024-04-29 PROCEDURE — 85025 COMPLETE CBC W/AUTO DIFF WBC: CPT | Performed by: STUDENT IN AN ORGANIZED HEALTH CARE EDUCATION/TRAINING PROGRAM

## 2024-04-29 PROCEDURE — 3080F DIAST BP >= 90 MM HG: CPT | Mod: CPTII,S$GLB,, | Performed by: STUDENT IN AN ORGANIZED HEALTH CARE EDUCATION/TRAINING PROGRAM

## 2024-04-29 PROCEDURE — 84153 ASSAY OF PSA TOTAL: CPT | Performed by: STUDENT IN AN ORGANIZED HEALTH CARE EDUCATION/TRAINING PROGRAM

## 2024-04-29 PROCEDURE — 1159F MED LIST DOCD IN RCRD: CPT | Mod: CPTII,S$GLB,, | Performed by: STUDENT IN AN ORGANIZED HEALTH CARE EDUCATION/TRAINING PROGRAM

## 2024-04-29 PROCEDURE — 99999 PR PBB SHADOW E&M-EST. PATIENT-LVL IV: CPT | Mod: PBBFAC,,, | Performed by: STUDENT IN AN ORGANIZED HEALTH CARE EDUCATION/TRAINING PROGRAM

## 2024-04-29 PROCEDURE — 1160F RVW MEDS BY RX/DR IN RCRD: CPT | Mod: CPTII,S$GLB,, | Performed by: STUDENT IN AN ORGANIZED HEALTH CARE EDUCATION/TRAINING PROGRAM

## 2024-04-29 PROCEDURE — 36415 COLL VENOUS BLD VENIPUNCTURE: CPT | Mod: PN | Performed by: STUDENT IN AN ORGANIZED HEALTH CARE EDUCATION/TRAINING PROGRAM

## 2024-04-29 PROCEDURE — 99386 PREV VISIT NEW AGE 40-64: CPT | Mod: S$GLB,,, | Performed by: STUDENT IN AN ORGANIZED HEALTH CARE EDUCATION/TRAINING PROGRAM

## 2024-04-29 PROCEDURE — 83036 HEMOGLOBIN GLYCOSYLATED A1C: CPT | Performed by: STUDENT IN AN ORGANIZED HEALTH CARE EDUCATION/TRAINING PROGRAM

## 2024-04-29 RX ORDER — NAPROXEN 500 MG/1
500 TABLET ORAL 2 TIMES DAILY PRN
Qty: 30 TABLET | Refills: 1 | Status: SHIPPED | OUTPATIENT
Start: 2024-04-29

## 2024-04-29 RX ORDER — METHOCARBAMOL 750 MG/1
750 TABLET, FILM COATED ORAL
COMMUNITY

## 2024-04-29 RX ORDER — LANCETS
EACH MISCELLANEOUS
Qty: 100 EACH | Refills: 0 | Status: SHIPPED | OUTPATIENT
Start: 2024-04-29

## 2024-04-29 RX ORDER — OXYCODONE HYDROCHLORIDE 5 MG/1
5 TABLET ORAL EVERY 6 HOURS PRN
COMMUNITY
Start: 2024-04-21

## 2024-04-29 RX ORDER — INSULIN GLARGINE 100 [IU]/ML
12 INJECTION, SOLUTION SUBCUTANEOUS NIGHTLY
Qty: 6 ML | Refills: 2 | Status: SHIPPED | OUTPATIENT
Start: 2024-04-29 | End: 2025-04-29

## 2024-04-29 RX ORDER — METFORMIN HYDROCHLORIDE 500 MG/1
1000 TABLET, EXTENDED RELEASE ORAL 2 TIMES DAILY WITH MEALS
Qty: 180 TABLET | Refills: 6 | Status: SHIPPED | OUTPATIENT
Start: 2024-04-29 | End: 2024-05-17

## 2024-04-29 RX ORDER — AMLODIPINE BESYLATE 5 MG/1
5 TABLET ORAL DAILY
Qty: 90 TABLET | Refills: 0 | Status: SHIPPED | OUTPATIENT
Start: 2024-04-29 | End: 2024-05-17

## 2024-04-29 RX ORDER — INSULIN PUMP SYRINGE, 3 ML
EACH MISCELLANEOUS
Qty: 1 EACH | Refills: 0 | Status: SHIPPED | OUTPATIENT
Start: 2024-04-29 | End: 2025-04-29

## 2024-04-29 RX ORDER — ATORVASTATIN CALCIUM 40 MG/1
40 TABLET, FILM COATED ORAL NIGHTLY
Qty: 90 TABLET | Refills: 0 | Status: SHIPPED | OUTPATIENT
Start: 2024-04-29 | End: 2024-05-17 | Stop reason: SDUPTHER

## 2024-04-29 NOTE — PROGRESS NOTES
Office visit  Patient: Kelvin Canela   4/29/2024     Assessment:     1. Annual physical exam    2. Uncontrolled hypertension    3. Type 2 diabetes mellitus without complication, with long-term current use of insulin      Plan:       1. Annual physical exam  -     TSH; Future; Expected date: 04/29/2024  -     Hemoglobin A1C; Future; Expected date: 04/29/2024  -     Lipid Panel; Future; Expected date: 04/29/2024  -     Comprehensive Metabolic Panel; Future; Expected date: 04/29/2024  -     CBC Auto Differential; Future; Expected date: 04/29/2024  -     PSA, SCREENING; Future; Expected date: 04/29/2024       -Discussed healthy habits and recommended preventative screening    2. Uncontrolled hypertension  -     amLODIPine (NORVASC) 5 MG tablet; Take 1 tablet (5 mg total) by mouth once daily.  Dispense: 90 tablet; Refill: 0        -improved in office compared to at Urgent Care; should go down to normal with amlodipine    3. Type 2 diabetes mellitus without complication, with long-term current use of insulin  -     blood-glucose meter kit; To check BG 1 time daily, to use with insurance preferred meter  Dispense: 1 each; Refill: 0  -     lancets Misc; To check BG 1 time daily, to use with insurance preferred meter  Dispense: 100 each; Refill: 0  -     blood sugar diagnostic Strp; To check BG 1 times daily, to use with insurance preferred meter  Dispense: 100 each; Refill: 2         -was previously on insulin glargine 18 units nightly, but as patient has been off insulin for quite some time, will start at lower dose of 12; patient counseled to check his blood sugar for 3-4 days in a row and then send me the values to determine whether we need to start at an even lower dose of insulin glargine  -     empagliflozin (JARDIANCE) 10 mg tablet; Take 1 tablet (10 mg total) by mouth once daily.  Dispense: 90 tablet; Refill: 0  -     metFORMIN (GLUCOPHAGE-XR) 500 MG ER 24hr tablet; Take 2 tablets (1,000 mg total) by mouth 2 (two)  times daily with meals.  Dispense: 180 tablet; Refill: 6  -     insulin (BASAGLAR KWIKPEN U-100 INSULIN) glargine 100 units/mL SubQ pen; Inject 12 Units into the skin every evening.  Dispense: 6 mL; Refill: 2    Return to clinic in 1 month or sooner as needed.            CHIEF COMPLAINT: annual physical    HPI: Kelvin Canela is a 58 y.o. male who presents for an annual physical. He hasn't seen a primary care doctor in years.    He reports he had a colonoscopy in [place] in 2018.  He reports no polyps, so he is next due in 2028.  He reports he has not been checking his blood glucose at home and doesn't have a meter.  He has not been taking any of his blood pressure or diabetes meds.      Current Outpatient Medications   Medication Instructions    amLODIPine (NORVASC) 5 MG tablet TAKE 1 TABLET(5 MG) BY MOUTH EVERY DAY FOR HIGH BLOOD PRESSURE    amLODIPine (NORVASC) 5 mg, Oral, Daily    atorvastatin (LIPITOR) 40 mg, Oral, Nightly, For cholesterol    BASAGLAR KWIKPEN U-100 INSULIN 12 Units, Subcutaneous, Nightly    BLOOD PRESSURE CUFF Misc 1 each, Misc.(Non-Drug; Combo Route), Daily    blood sugar diagnostic Strp To check BG 3 times daily, to use with insurance preferred meter    blood sugar diagnostic Strp To check BG 1 times daily, to use with insurance preferred meter    blood-glucose meter kit To check BG 1 time daily, to use with insurance preferred meter    empagliflozin (JARDIANCE) 10 mg, Oral, Daily    lancets Misc To check BG 3 times daily, to use with insurance preferred meter    lancets Misc To check BG 1 time daily, to use with insurance preferred meter    metFORMIN (GLUCOPHAGE-XR) 1,000 mg, Oral, 2 times daily with meals    methocarbamoL (ROBAXIN) 750 mg, Oral    naproxen (NAPROSYN) 500 mg, Oral, 2 times daily PRN    ONETOUCH ULTRA2 METER Misc 1 each, Subcutaneous, 3 times daily, Use to test blood glucose    oxyCODONE (ROXICODONE) 5 mg, Oral, Every 6 hours PRN    pen needle, diabetic (PEN NEEDLE) 31 gauge x  "5/16" Ndle 1 each, Misc.(Non-Drug; Combo Route), Daily    sildenafiL (VIAGRA) 50 mg, Oral, Daily PRN, 50 mg once daily as needed 1 hour before sexual activity; may be taken up to 4 hours before sexual activity. Reduce to 25 mg once daily if side effects occur       Lab Results   Component Value Date    HGBA1C 9.3 (H) 06/24/2021    HGBA1C 9.9 (H) 01/28/2021     No results found for: "MICALBCREAT"  Lab Results   Component Value Date    LDLCALC 79.2 06/24/2021    LDLCALC 99.6 01/28/2021    CHOL 141 06/24/2021    HDL 46 06/24/2021    TRIG 79 06/24/2021       Lab Results   Component Value Date     01/28/2021    K 5.0 01/28/2021     01/28/2021    CO2 26 01/28/2021     (H) 01/28/2021    BUN 13 01/28/2021    CREATININE 1.4 01/28/2021    CALCIUM 9.5 01/28/2021    PROT 7.7 01/28/2021    ALBUMIN 3.8 01/28/2021    BILITOT 0.4 01/28/2021    ALKPHOS 85 01/28/2021    AST 18 01/28/2021    ALT 13 01/28/2021    ANIONGAP 10 01/28/2021    ESTGFRAFRICA >60.0 01/28/2021    EGFRNONAA 56.6 (A) 01/28/2021    WBC 4.76 01/28/2021    HGB 13.2 (L) 01/28/2021    HGB 10.2 (L) 02/15/2018    HCT 40.6 01/28/2021    MCV 90 01/28/2021     01/28/2021       No results found for: "LH", "FSH", "TOTALTESTOST", "PROGESTERONE", "ESTRADIOL", "[id number]", "[id number]", "FERRITIN", "IRON", "TRANSFERRIN", "TIBC", "FESATURATED", "ZINC"      Past Medical History:   Diagnosis Date    Diabetes mellitus     Diabetes mellitus, type 2      Past Surgical History:   Procedure Laterality Date    I&D rectal abcess  02/14/2018     Vitals:    04/29/24 1011 04/29/24 1040   BP: (!) 140/90 (!) 136/90   Pulse: 104    Resp: 16    Temp: 98.4 °F (36.9 °C)    TempSrc: Oral    SpO2: 99%    Weight: 71.3 kg (157 lb 3 oz)    Height: 6' 1" (1.854 m)    PainSc:   3    PainLoc: Arm      Objective:   Physical Exam  Constitutional:       General: He is not in acute distress.     Appearance: Normal appearance. He is well-developed.   HENT:      Head: Normocephalic " and atraumatic.      Right Ear: Hearing and external ear normal. No decreased hearing noted. No drainage or swelling.      Left Ear: Hearing and external ear normal. No decreased hearing noted. No drainage or swelling.      Nose: Nose normal. No rhinorrhea.      Mouth/Throat:      Mouth: Mucous membranes are moist.   Eyes:      General: Lids are normal.         Right eye: No discharge.         Left eye: No discharge.      Conjunctiva/sclera: Conjunctivae normal.      Right eye: Right conjunctiva is not injected. No exudate.     Left eye: Left conjunctiva is not injected. No exudate.  Cardiovascular:      Rate and Rhythm: Normal rate and regular rhythm.      Heart sounds: Normal heart sounds. No murmur heard.     No friction rub. No gallop.   Pulmonary:      Effort: Pulmonary effort is normal. No respiratory distress.      Breath sounds: No stridor. No wheezing, rhonchi or rales.   Musculoskeletal:         General: No deformity.      Right lower leg: No edema.      Left lower leg: No edema.      Comments: Left arm in splint   Skin:     General: Skin is warm and dry.   Neurological:      General: No focal deficit present.      Mental Status: He is alert and oriented to person, place, and time.   Psychiatric:         Mood and Affect: Mood normal.         Speech: Speech normal.         Behavior: Behavior normal.             July Goldberg MD  Internal Medicine and Pediatrics

## 2024-05-15 ENCOUNTER — TELEPHONE (OUTPATIENT)
Dept: PRIMARY CARE CLINIC | Facility: CLINIC | Age: 59
End: 2024-05-15
Payer: COMMERCIAL

## 2024-05-15 NOTE — TELEPHONE ENCOUNTER
----- Message from Marielle Varela sent at 5/15/2024  9:16 AM CDT -----  Contact: Kelvin   Kelvin would kylah a call back. He has an appt on 05/29/24 with Dr Goldberg & he would kylah to see if he can have an appt with her on this coming Friday

## 2024-05-17 ENCOUNTER — OFFICE VISIT (OUTPATIENT)
Dept: PRIMARY CARE CLINIC | Facility: CLINIC | Age: 59
End: 2024-05-17
Payer: COMMERCIAL

## 2024-05-17 VITALS
SYSTOLIC BLOOD PRESSURE: 146 MMHG | HEART RATE: 94 BPM | HEIGHT: 73 IN | WEIGHT: 163.13 LBS | DIASTOLIC BLOOD PRESSURE: 94 MMHG | OXYGEN SATURATION: 100 % | BODY MASS INDEX: 21.62 KG/M2

## 2024-05-17 DIAGNOSIS — E11.9 TYPE 2 DIABETES MELLITUS WITHOUT COMPLICATION, WITH LONG-TERM CURRENT USE OF INSULIN: ICD-10-CM

## 2024-05-17 DIAGNOSIS — Z79.4 TYPE 2 DIABETES MELLITUS WITHOUT COMPLICATION, WITH LONG-TERM CURRENT USE OF INSULIN: ICD-10-CM

## 2024-05-17 DIAGNOSIS — I10 ESSENTIAL (PRIMARY) HYPERTENSION: Primary | ICD-10-CM

## 2024-05-17 DIAGNOSIS — E78.2 MIXED HYPERLIPIDEMIA: ICD-10-CM

## 2024-05-17 DIAGNOSIS — E11.9 TYPE 2 DIABETES MELLITUS WITHOUT COMPLICATION, WITHOUT LONG-TERM CURRENT USE OF INSULIN: ICD-10-CM

## 2024-05-17 PROCEDURE — 99214 OFFICE O/P EST MOD 30 MIN: CPT | Mod: S$GLB,,, | Performed by: STUDENT IN AN ORGANIZED HEALTH CARE EDUCATION/TRAINING PROGRAM

## 2024-05-17 PROCEDURE — 1160F RVW MEDS BY RX/DR IN RCRD: CPT | Mod: CPTII,S$GLB,, | Performed by: STUDENT IN AN ORGANIZED HEALTH CARE EDUCATION/TRAINING PROGRAM

## 2024-05-17 PROCEDURE — 3077F SYST BP >= 140 MM HG: CPT | Mod: CPTII,S$GLB,, | Performed by: STUDENT IN AN ORGANIZED HEALTH CARE EDUCATION/TRAINING PROGRAM

## 2024-05-17 PROCEDURE — 3052F HG A1C>EQUAL 8.0%<EQUAL 9.0%: CPT | Mod: CPTII,S$GLB,, | Performed by: STUDENT IN AN ORGANIZED HEALTH CARE EDUCATION/TRAINING PROGRAM

## 2024-05-17 PROCEDURE — 3008F BODY MASS INDEX DOCD: CPT | Mod: CPTII,S$GLB,, | Performed by: STUDENT IN AN ORGANIZED HEALTH CARE EDUCATION/TRAINING PROGRAM

## 2024-05-17 PROCEDURE — 99999 PR PBB SHADOW E&M-EST. PATIENT-LVL IV: CPT | Mod: PBBFAC,,, | Performed by: STUDENT IN AN ORGANIZED HEALTH CARE EDUCATION/TRAINING PROGRAM

## 2024-05-17 PROCEDURE — 3080F DIAST BP >= 90 MM HG: CPT | Mod: CPTII,S$GLB,, | Performed by: STUDENT IN AN ORGANIZED HEALTH CARE EDUCATION/TRAINING PROGRAM

## 2024-05-17 PROCEDURE — 1159F MED LIST DOCD IN RCRD: CPT | Mod: CPTII,S$GLB,, | Performed by: STUDENT IN AN ORGANIZED HEALTH CARE EDUCATION/TRAINING PROGRAM

## 2024-05-17 RX ORDER — METFORMIN HYDROCHLORIDE 1000 MG/1
1000 TABLET ORAL 2 TIMES DAILY WITH MEALS
Qty: 180 TABLET | Refills: 3 | Status: SHIPPED | OUTPATIENT
Start: 2024-05-17 | End: 2024-05-17

## 2024-05-17 RX ORDER — METFORMIN HYDROCHLORIDE 500 MG/1
1000 TABLET, EXTENDED RELEASE ORAL 2 TIMES DAILY WITH MEALS
Qty: 360 TABLET | Refills: 3 | Status: SHIPPED | OUTPATIENT
Start: 2024-05-17 | End: 2025-05-17

## 2024-05-17 RX ORDER — DEXTROSE 4 G
1 TABLET,CHEWABLE ORAL 3 TIMES DAILY
Qty: 1 EACH | Refills: 0 | Status: SHIPPED | OUTPATIENT
Start: 2024-05-17

## 2024-05-17 RX ORDER — AMLODIPINE BESYLATE 5 MG/1
5 TABLET ORAL DAILY
Qty: 30 TABLET | Refills: 5 | Status: SHIPPED | OUTPATIENT
Start: 2024-05-17

## 2024-05-17 RX ORDER — LANCETS
EACH MISCELLANEOUS
Qty: 100 EACH | Refills: 2 | Status: SHIPPED | OUTPATIENT
Start: 2024-05-17

## 2024-05-17 RX ORDER — ATORVASTATIN CALCIUM 40 MG/1
40 TABLET, FILM COATED ORAL NIGHTLY
Qty: 90 TABLET | Refills: 3 | Status: SHIPPED | OUTPATIENT
Start: 2024-05-17 | End: 2025-05-17

## 2024-05-17 NOTE — PROGRESS NOTES
Office visit  Patient: Kelvin Canela   5/17/2024     Assessment:     1. Essential (primary) hypertension    2. Type 2 diabetes mellitus without complication, with long-term current use of insulin    3. Mixed hyperlipidemia    4. Type 2 diabetes mellitus without complication, without long-term current use of insulin      Plan:       1. Essential (primary) hypertension  -     amLODIPine (NORVASC) 5 MG tablet; Take 1 tablet (5 mg total) by mouth once daily.  Dispense: 30 tablet; Refill: 5        -patient has not been taking medication, so BP elevated today in clinic; prescription sent to our pharmacy in attempt to get them covered    2. Type 2 diabetes mellitus without complication, with long-term current use of insulin  -     blood sugar diagnostic Strp; To check Blood Glucose 3 times daily, to use with insurance preferred meter  Dispense: 100 each; Refill: 2  -     lancets Misc; To check Blood Glucose 3 times daily, to use with insurance preferred meter  Dispense: 100 each; Refill: 2  -     Discontinue: empagliflozin (JARDIANCE) 10 mg tablet; Take 1 tablet (10 mg total) by mouth once daily.  Dispense: 90 tablet; Refill: 0  -     POCT Glucose, Hand-Held Device  -     blood-glucose meter Misc; Test blood sugar 3 (three) times daily.  Dispense: 1 each; Refill: 0  -     metFORMIN (GLUCOPHAGE-XR) 500 MG ER 24hr tablet; Take 2 tablets (1,000 mg total) by mouth 2 (two) times daily with meals.  Dispense: 360 tablet; Refill: 3  -counseled patient to check blood glucose daily while taking insulin; continue insulin glargine 12 units nightly    3. Mixed hyperlipidemia  -     atorvastatin (LIPITOR) 40 MG tablet; Take 1 tablet (40 mg total) by mouth every evening. For cholesterol  Dispense: 90 tablet; Refill: 3      Return to clinic in 1 month or sooner as needed.        CHIEF COMPLAINT: follow up hypertension    HPI: Kelvin Canela is a 58 y.o. male who presents for follow up of hypertension.    He hasn't been able to get any of his  "medications. He was told that his medications would cost $1800. He was told to call his insurance company, and he called them but couldn't get through to anyone. He's only been taking oxycodone and muscle relaxers.  He has been taking insulin glargine 12 units nightly, but has not been checking his blood sugars.  He denies chest pain, palpitations, headaches, dizziness, blurry vision, double vision, leg swelling.        Current Outpatient Medications   Medication Instructions    amLODIPine (NORVASC) 5 mg, Oral, Daily    atorvastatin (LIPITOR) 40 mg, Oral, Nightly, For cholesterol    BASAGLAR KWIKPEN U-100 INSULIN 12 Units, Subcutaneous, Nightly    BLOOD PRESSURE CUFF Misc 1 each, Misc.(Non-Drug; Combo Route), Daily    blood sugar diagnostic Strp To check BG 3 times daily, to use with insurance preferred meter    blood sugar diagnostic Strp To check Blood Glucose 3 times daily, to use with insurance preferred meter    blood-glucose meter kit To check BG 1 time daily, to use with insurance preferred meter    blood-glucose meter Misc Test blood sugar 3 (three) times daily.    lancets Misc To check BG 1 time daily, to use with insurance preferred meter    lancets Misc To check Blood Glucose 3 times daily, to use with insurance preferred meter    metFORMIN (GLUCOPHAGE-XR) 1,000 mg, Oral, 2 times daily with meals    methocarbamoL (ROBAXIN) 750 mg, Oral    naproxen (NAPROSYN) 500 mg, Oral, 2 times daily PRN    oxyCODONE (ROXICODONE) 5 mg, Oral, Every 6 hours PRN    pen needle, diabetic (PEN NEEDLE) 31 gauge x 5/16" Ndle 1 each, Misc.(Non-Drug; Combo Route), Daily    sildenafiL (VIAGRA) 50 mg, Oral, Daily PRN, 50 mg once daily as needed 1 hour before sexual activity; may be taken up to 4 hours before sexual activity. Reduce to 25 mg once daily if side effects occur       Lab Results   Component Value Date    HGBA1C 8.4 (H) 04/29/2024    HGBA1C 9.3 (H) 06/24/2021    HGBA1C 9.9 (H) 01/28/2021     No results found for: " ""MICALBCREAT"  Lab Results   Component Value Date    LDLCALC 113.8 04/29/2024    LDLCALC 79.2 06/24/2021    CHOL 178 04/29/2024    HDL 49 04/29/2024    TRIG 76 04/29/2024       Lab Results   Component Value Date     (L) 04/29/2024    K 4.7 04/29/2024    CL 96 04/29/2024    CO2 24 04/29/2024     (H) 04/29/2024    BUN 23 (H) 04/29/2024    CREATININE 1.6 (H) 04/29/2024    CALCIUM 9.9 04/29/2024    PROT 8.4 04/29/2024    ALBUMIN 3.6 04/29/2024    BILITOT 0.7 04/29/2024    ALKPHOS 126 04/29/2024    AST 30 04/29/2024    ALT 23 04/29/2024    ANIONGAP 13 04/29/2024    ESTGFRAFRICA >60.0 01/28/2021    EGFRNONAA 56.6 (A) 01/28/2021    WBC 6.37 04/29/2024    HGB 13.8 (L) 04/29/2024    HGB 13.2 (L) 01/28/2021    HCT 40.5 04/29/2024    MCV 89 04/29/2024     04/29/2024    TSH 3.156 04/29/2024    PSA 1.7 04/29/2024       No results found for: "LH", "FSH", "TOTALTESTOST", "PROGESTERONE", "ESTRADIOL", "[id number]", "[id number]", "FERRITIN", "IRON", "TRANSFERRIN", "TIBC", "FESATURATED", "ZINC"      Past Medical History:   Diagnosis Date    Diabetes mellitus     Diabetes mellitus, type 2      Past Surgical History:   Procedure Laterality Date    I&D rectal abcess  02/14/2018     Vitals:    05/17/24 0919   BP: (!) 146/94   Pulse: 94   SpO2: 100%   Weight: 74 kg (163 lb 2.3 oz)   Height: 6' 1" (1.854 m)   PainSc: 0-No pain     Objective:   Physical Exam  Constitutional:       General: He is not in acute distress.     Appearance: Normal appearance. He is well-developed.   HENT:      Head: Normocephalic and atraumatic.      Right Ear: Hearing and external ear normal. No decreased hearing noted. No drainage or swelling.      Left Ear: Hearing and external ear normal. No decreased hearing noted. No drainage or swelling.      Nose: Nose normal. No rhinorrhea.      Mouth/Throat:      Mouth: Mucous membranes are moist.   Eyes:      General: Lids are normal.         Right eye: No discharge.         Left eye: No discharge. "      Conjunctiva/sclera: Conjunctivae normal.      Right eye: Right conjunctiva is not injected. No exudate.     Left eye: Left conjunctiva is not injected. No exudate.  Cardiovascular:      Rate and Rhythm: Normal rate and regular rhythm.      Heart sounds: Normal heart sounds. No murmur heard.     No friction rub. No gallop.   Pulmonary:      Effort: Pulmonary effort is normal. No respiratory distress.      Breath sounds: No stridor. No wheezing, rhonchi or rales.   Musculoskeletal:         General: No deformity.      Right lower leg: No edema.      Left lower leg: No edema.      Comments: Left forearm in splint   Skin:     General: Skin is warm and dry.   Neurological:      General: No focal deficit present.      Mental Status: He is alert and oriented to person, place, and time.   Psychiatric:         Mood and Affect: Mood normal.         Speech: Speech normal.         Behavior: Behavior normal.             July Goldberg MD  Internal Medicine and Pediatrics

## 2024-10-27 ENCOUNTER — PATIENT MESSAGE (OUTPATIENT)
Dept: ADMINISTRATIVE | Facility: HOSPITAL | Age: 59
End: 2024-10-27
Payer: COMMERCIAL

## 2025-01-09 ENCOUNTER — PATIENT MESSAGE (OUTPATIENT)
Dept: ADMINISTRATIVE | Facility: HOSPITAL | Age: 60
End: 2025-01-09
Payer: COMMERCIAL

## 2025-01-26 ENCOUNTER — PATIENT MESSAGE (OUTPATIENT)
Dept: ADMINISTRATIVE | Facility: HOSPITAL | Age: 60
End: 2025-01-26
Payer: COMMERCIAL

## 2025-02-05 ENCOUNTER — HOSPITAL ENCOUNTER (EMERGENCY)
Facility: OTHER | Age: 60
Discharge: HOME | End: 2025-02-05
Attending: EMERGENCY MEDICINE

## 2025-02-05 VITALS
DIASTOLIC BLOOD PRESSURE: 82 MMHG | BODY MASS INDEX: 24.78 KG/M2 | WEIGHT: 187 LBS | TEMPERATURE: 99 F | SYSTOLIC BLOOD PRESSURE: 122 MMHG | RESPIRATION RATE: 16 BRPM | OXYGEN SATURATION: 100 % | HEART RATE: 98 BPM | HEIGHT: 73 IN

## 2025-02-05 DIAGNOSIS — R56.9 SEIZURES: ICD-10-CM

## 2025-02-05 DIAGNOSIS — Q28.2 AVM (ARTERIOVENOUS MALFORMATION) BRAIN: Primary | ICD-10-CM

## 2025-02-05 LAB — POCT GLUCOSE: 284 MG/DL (ref 70–110)

## 2025-02-05 PROCEDURE — 99284 EMERGENCY DEPT VISIT MOD MDM: CPT

## 2025-02-05 PROCEDURE — 82962 GLUCOSE BLOOD TEST: CPT

## 2025-02-05 PROCEDURE — 25000003 PHARM REV CODE 250

## 2025-02-05 RX ORDER — METHYLPREDNISOLONE 4 MG/1
TABLET ORAL
Qty: 21 EACH | Refills: 0 | Status: SHIPPED | OUTPATIENT
Start: 2025-02-05 | End: 2025-02-05

## 2025-02-05 RX ORDER — LEVETIRACETAM 500 MG/1
500 TABLET ORAL 2 TIMES DAILY
Qty: 60 TABLET | Refills: 0 | Status: SHIPPED | OUTPATIENT
Start: 2025-02-05 | End: 2025-03-07

## 2025-02-05 RX ORDER — LEVETIRACETAM 500 MG/1
1000 TABLET ORAL
Status: COMPLETED | OUTPATIENT
Start: 2025-02-05 | End: 2025-02-05

## 2025-02-05 RX ORDER — METHYLPREDNISOLONE 4 MG/1
TABLET ORAL
Qty: 21 EACH | Refills: 0 | Status: SHIPPED | OUTPATIENT
Start: 2025-02-05 | End: 2025-02-26

## 2025-02-05 RX ORDER — LEVETIRACETAM 500 MG/1
500 TABLET ORAL 2 TIMES DAILY
Qty: 60 TABLET | Refills: 11 | Status: SHIPPED | OUTPATIENT
Start: 2025-02-05 | End: 2025-02-05

## 2025-02-05 RX ADMIN — LEVETIRACETAM 1000 MG: 500 TABLET, FILM COATED ORAL at 05:02

## 2025-02-05 NOTE — ED TRIAGE NOTES
"Pt states he had a seizure yesterday while shooting pool. He does not have a hx of seizures and was brought to [facility]. He was told by [facility] that he had a' lesion or something that disconnected in the blood vessel" but wanted to see if he had another episode. He was kept NPO so he said he eventually signed out AMA after his CBG was 32. He decided to come to the ED for another opinion. Pt states he feels great right now  "

## 2025-02-05 NOTE — ED PROVIDER NOTES
"Encounter Date: 2/5/2025       History     Chief Complaint   Patient presents with    Seizures     Had a seizure yesterday and was brought to [facility]. Denies previous hx of seizures or meds. Was told at [facility] that he has a quarter-sized lesion. Left AMA. States today he feels "pretty good".      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 Patient initially presented to [facility] on 02/02/25 with 1st onset seizure and was found to have R occipital isodense (likely a cavernous malformation or AVM).  Patient was plan to have inpatient angiogram but was NPO for a day and procedure kept being delayed and became hypoglycemic requiring multiple portions of D50 patient left AMA.  Patient was reportedly prescribed Keppra outpatient follow-up recommended.     Patient presents today to establish care with [facility] and wishes to have further workup and wants his angiogram done within the [facility] network.  Patient denies having been prescribed outpatient Keppra.  Patient tells me he is feeling well today and has not had any seizures since his seizure 3 days ago.  Patient denies any vision changes, numbness, or weakness.      Review of patient's allergies indicates:   Allergen Reactions    Shellfish containing products Hives     Past Medical History:   Diagnosis Date    Diabetes mellitus     Diabetes mellitus, type 2      Past Surgical History:   Procedure Laterality Date    I&D rectal abcess  02/14/2018     No family history on file.  Social History     Tobacco Use    Smoking status: Heavy Smoker     Current packs/day: 1.00     Average packs/day: 1 pack/day for 28.1 years (28.1 ttl pk-yrs)     Types: Cigarettes     Start date: 1/13/1997     Passive exposure: Never    Smokeless tobacco: Never    Tobacco comments:     1 pack a week   Substance Use Topics    Alcohol use: Yes     Alcohol/week: " 1.0 standard drink of alcohol     Types: 1 Cans of beer per week    Drug use: No     Review of Systems    Physical Exam     Initial Vitals [02/05/25 1139]   BP Pulse Resp Temp SpO2   (!) 167/84 100 18 98.5 °F (36.9 °C) 99 %      MAP       --         Physical Exam    Nursing note and vitals reviewed.          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 Patient is able to vocalise, breathing spontaneously, hemodynamically stable, oriented, moving all 4 limbs spontaneously.  Examination unremarkable.      Differential diagnosis  Breakthrough seizures  Recurrence of hypoglycemia  Considered other life-threatening emergencies including worsening of brain being, expansion of mass, electrolyte/metabolic derangements however lower suspicion given history reassuring physical examination    ED management  Patient was stable on arrival and did not have any seizures and examination not concerning for any neurological follow up.  Head imaging was not repeated.  Patient discussed neurosurgery for need for transfer versus outpatient follow up.  Patient was discussed with Dr. Kinsey from Neurosurgery who reports that given that the patient is stable with no neurological deficits and no seizure since [place] [facility] [place] not have immediate need for transfer or urgent IR angiogram.  He recommended loading patient with Keppra 1000 mg and discharging on 500 mg BD.  Additionally neurosurgery recommended giving patient Medrol Dosepak.  Patient will follow up with Neurosurgery on  Friday in clinic.  Patient was given return precautions for breakthrough seizure or new neurological deficits.  Patient discharged.    Amount and/or Complexity of Data Reviewed  Labs: ordered. Decision-making details documented in ED Course.    Risk  Prescription drug management.               ED Course as of 02/05/25 1830   Wed Feb 05, 2025   1438 BP(!): 167/84 [PM]   1439 Temp: 98.5 °F (36.9 °C) [PM]   1439 Pulse: 100 [PM]   1439 Resp: 18 [PM]   1439 SpO2: 99 % [PM]   1500 POCT Glucose(!): 284 [PM]   1716 Attending Attestation:   Physician Attestation Statement for Resident:  History, findings, plan discussed with the resident.  I have personally seen and examined this patient. If performed, I independently interpreted labs, EKGs, and imaging.  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 Provider    methylPREDNISolone (MEDROL DOSEPACK) 4 mg tablet  (Status: Discontinued) use as directed 21 each 2/5/2025 2/5/2025 Telma Velazquez MD    levETIRAcetam (KEPPRA) 500 MG Tab  (Status: Discontinued) Take 1 tablet (500 mg total) by mouth 2 (two) times daily. 60 tablet 2/5/2025 2/5/2025 Telma Velazquez MD    methylPREDNISolone (MEDROL DOSEPACK) 4 mg tablet use as directed 21 each 2/5/2025 2/26/2025 Telma Velazquez MD    levETIRAcetam (KEPPRA) 500 MG Tab Take 1 tablet (500 mg total) by mouth 2 (two) times daily. 60 tablet 2/5/2025 3/7/2025 Telma Velazquez MD          Follow-up Information       Follow up With Specialties Details Why Contact Info Additional Information    July Goldberg MD Internal Medicine Schedule an appointment as soon as possible for a visit   [facility]  [facility] [address]  [phone]       [facility] - Emergency Dept Emergency Medicine  As needed, If symptoms worsen [address]  [facility] [id number] [phone]     [facility] Neurosurgery 8th Fl Neurosurgery   [address]  [facility] [address] [phone] 8th Floor Clinic [place] Please park in [facility]. Check in desk is located in the lobby. Please take the C elevator to 8th floor which opens to the lobby.             Telma Velazquez MD  Resident  02/05/25 3757

## 2025-02-05 NOTE — FIRST PROVIDER EVALUATION
Medical screening examination initiated.  I have conducted a focused provider triage encounter, findings are as follows:    Brief history of present illness:  seizure yesterday- no hx of seizures    There were no vitals filed for this visit.    Pertinent physical exam:  GCS 15- no tremors    Brief workup plan:  labs, CT head    Review of chart shows recent hospitalization for brain mass. Left AMA before full work up could be completed.     Preliminary workup initiated; this workup will be continued and followed by the physician or advanced practice provider that is assigned to the patient when roomed.

## 2025-02-05 NOTE — Clinical Note
"Kelvin"Guillermo Canela was seen and treated in our emergency department on 2/5/2025.  He may return with limitations on 02/21/2025.  No driving or operating of heavy machinery     Sincerely,      Telma Velazquez MD    "

## 2025-02-06 ENCOUNTER — TELEPHONE (OUTPATIENT)
Dept: NEUROSURGERY | Facility: CLINIC | Age: 60
End: 2025-02-06

## 2025-02-06 NOTE — TELEPHONE ENCOUNTER
Called and spoke to pt confirming appt on:    Future Appointments   Date Time Provider Department Center   2/12/2025 10:30 AM Eva Pastrana PA-C [facility] NEUROS8 [facility]       ----- Message from Tulio Sanz sent at 2/5/2025  5:35 PM CST -----  Regarding: Clinic Appt  Per Amelie, please schedule this pt for outpatient appt with TOMASZ this week

## 2025-02-12 ENCOUNTER — OFFICE VISIT (OUTPATIENT)
Dept: NEUROSURGERY | Facility: CLINIC | Age: 60
End: 2025-02-12

## 2025-02-12 DIAGNOSIS — R51.9 NONINTRACTABLE EPISODIC HEADACHE, UNSPECIFIED HEADACHE TYPE: ICD-10-CM

## 2025-02-12 DIAGNOSIS — Q27.30 AVM (ARTERIOVENOUS MALFORMATION): ICD-10-CM

## 2025-02-12 DIAGNOSIS — Q28.3 CAVERNOUS MALFORMATION: Primary | ICD-10-CM

## 2025-02-12 PROCEDURE — 99999 PR PBB SHADOW E&M-EST. PATIENT-LVL III: CPT | Mod: PBBFAC,,, | Performed by: PHYSICIAN ASSISTANT

## 2025-02-12 PROCEDURE — 99213 OFFICE O/P EST LOW 20 MIN: CPT | Mod: PBBFAC | Performed by: PHYSICIAN ASSISTANT

## 2025-02-12 RX ORDER — LEVETIRACETAM 500 MG/1
500 TABLET ORAL 2 TIMES DAILY
Qty: 60 TABLET | Refills: 1 | Status: SHIPPED | OUTPATIENT
Start: 2025-02-12 | End: 2026-02-12

## 2025-02-18 ENCOUNTER — PATIENT OUTREACH (OUTPATIENT)
Dept: INTERNAL MEDICINE | Facility: CLINIC | Age: 60
End: 2025-02-18

## 2025-02-18 VITALS — DIASTOLIC BLOOD PRESSURE: 82 MMHG | SYSTOLIC BLOOD PRESSURE: 122 MMHG

## 2025-02-18 NOTE — PROGRESS NOTES
Neurosurgery  History & Physical    SUBJECTIVE:     Chief Complaint: cavernous malformation    History of Present Illness:  58 yo male with history of HTN, HLP, DMII, who presents to clinic as a hospital follow up of right parietal cavernous malformation. He presented to [facility] for evaluation of first time seizure 2/2/25. He was found to have newly diagnosed cavernous malformation. The patient was planned to have inpatient angiogram but the procedure was delayed and pt left AMA due to NPO status. Patient presents today to establish care with [facility] and wishes to have further workup and wants his angiogram done within the [facility] network. He denies any HA, visual changes, recurrent seizures, or new focal neurologic deficits. He has been compliant with keppra.     Review of patient's allergies indicates:   Allergen Reactions    Shellfish containing products Hives       Current Medications[1]    Past Medical History:   Diagnosis Date    Diabetes mellitus     Diabetes mellitus, type 2      Past Surgical History:   Procedure Laterality Date    I&D rectal abcess  02/14/2018     Family History    None       Social History     Socioeconomic History    Marital status: Single   Tobacco Use    Smoking status: Heavy Smoker     Current packs/day: 1.00     Average packs/day: 1 pack/day for 28.1 years (28.1 ttl pk-yrs)     Types: Cigarettes     Start date: 1/13/1997     Passive exposure: Never    Smokeless tobacco: Never    Tobacco comments:     1 pack a week   Substance and Sexual Activity    Alcohol use: Yes     Alcohol/week: 1.0 standard drink of alcohol     Types: 1 Cans of beer per week    Drug use: No    Sexual activity: Yes     Birth control/protection: None     Social Drivers of Health     Food Insecurity: No Food Insecurity (2/3/2025)    Received from [facility]    Hunger Vital Sign     Worried About Running Out of Food in the Last Year: Never true     Ran Out of Food in the Last Year: Never true   Transportation Needs:  No Transportation Needs (2/3/2025)    Received from [facility]    PRAPARE - Transportation     Lack of Transportation (Medical): No     Lack of Transportation (Non-Medical): No   Housing Stability: Low Risk  (2/3/2025)    Received from [facility]    Housing Stability Vital Sign     Unable to Pay for Housing in the Last Year: No     Number of Times Moved in the Last Year: 0     Homeless in the Last Year: No       Review of Systems    OBJECTIVE:     Vital Signs  Pain Score: 0-No pain  There is no height or weight on file to calculate BMI.      Neurosurgery Physical Exam  General: well developed, well nourished, no distress.   Head: normocephalic, atraumatic  Neurologic: Alert and oriented. Thought content appropriate.  GCS: Motor: 6/Verbal: 5/Eyes: 4 GCS Total: 15  Mental Status: Awake, Alert, Oriented x3  Cranial nerves: face symmetric, tongue midline, CN II-XII grossly intact.   Eyes: pupils equal, round, reactive to light with accomodation, EOMI.   Motor Strength:Moves all extremities spontaneously with good tone.  Full strength upper and lower extremities. No abnormal movements seen. DTR's - 2 + and symmetric in UE and LE  Pronator Drift: no drift noted  Finger-to-nose: Intact bilaterally    Diagnostic Results:  CTH and MRI brain from [facility]  reviewed and shows T1 hyperintense and T2 hyperintense mixed solid and cystic mass in the right parieto-occipital region measuring 2.1 x 1.9 x 1.3 cm. There is signal loss and blooming noted on SWI images.     ASSESSMENT/PLAN:     60 yo male with newly diagnosed right parieto-occipital cavernous malformation s/p new onset seizure who presents to [facility] care. We will plan for outpatient DSA. I would like to get EEG and refer him to Neurology. We will plan to see I'm back following DSA with repeat CTH. Imaging reviewed and plan discussed with Dr. Kinsey.    All symptoms and signs that require emergent or urgent treatment were reviewed. The plan was discussed with the patient.  All of the the patient's questions and concerns were answered and he voiced understanding. Please feel free to call with any further questions.       Esha West PA-C  Neurosurgery            Note dictated with voice recognition software, please excuse any grammatical errors.       [1]   Current Outpatient Medications   Medication Sig Dispense Refill    amLODIPine (NORVASC) 5 MG tablet Take 1 tablet (5 mg total) by mouth once daily. 30 tablet 5    atorvastatin (LIPITOR) 40 MG tablet Take 1 tablet (40 mg total) by mouth every evening. For cholesterol 90 tablet 3    BLOOD PRESSURE CUFF Misc 1 each by Misc.(Non-Drug; Combo Route) route once daily. 1 each 0    blood sugar diagnostic Strp To check BG 3 times daily, to use with insurance preferred meter 100 each 2    blood sugar diagnostic Strp To check Blood Glucose 3 times daily, to use with insurance preferred meter 100 each 2    blood-glucose meter kit To check BG 1 time daily, to use with insurance preferred meter 1 each 0    blood-glucose meter Misc Test blood sugar 3 (three) times daily. 1 each 0    insulin (BASAGLAR KWIKPEN U-100 INSULIN) glargine 100 units/mL SubQ pen Inject 12 Units into the skin every evening. 6 mL 2    lancets Misc To check BG 1 time daily, to use with insurance preferred meter 100 each 0    lancets Misc To check Blood Glucose 3 times daily, to use with insurance preferred meter 100 each 2    levETIRAcetam (KEPPRA) 500 MG Tab Take 1 tablet (500 mg total) by mouth 2 (two) times daily. 60 tablet 0    metFORMIN (GLUCOPHAGE-XR) 500 MG ER 24hr tablet Take 2 tablets (1,000 mg total) by mouth 2 (two) times daily with meals. 360 tablet 3    methocarbamoL (ROBAXIN) 750 MG Tab Take 750 mg by mouth.      methylPREDNISolone (MEDROL DOSEPACK) 4 mg tablet use as directed 21 each 0    naproxen (NAPROSYN) 500 MG tablet Take 1 tablet (500 mg total) by mouth 2 (two) times daily as needed (take with food as needed for back pain). 30 tablet 1    oxyCODONE  "(ROXICODONE) 5 MG immediate release tablet Take 5 mg by mouth every 6 (six) hours as needed.      pen needle, diabetic (PEN NEEDLE) 31 gauge x 5/16" Ndle 1 each by Misc.(Non-Drug; Combo Route) route once daily. 100 each 1    sildenafiL (VIAGRA) 50 MG tablet Take 1 tablet (50 mg total) by mouth daily as needed for Erectile Dysfunction. 50 mg once daily as needed 1 hour before sexual activity; may be taken up to 4 hours before sexual activity. Reduce to 25 mg once daily if side effects occur 30 tablet 3    levETIRAcetam (KEPPRA) 500 MG Tab Take 1 tablet (500 mg total) by mouth 2 (two) times daily. 60 tablet 1     No current facility-administered medications for this visit.     "

## 2025-02-19 DIAGNOSIS — E11.9 TYPE 2 DIABETES MELLITUS WITHOUT COMPLICATION, UNSPECIFIED WHETHER LONG TERM INSULIN USE: ICD-10-CM

## 2025-02-19 DIAGNOSIS — E11.9 TYPE 2 DIABETES MELLITUS WITHOUT COMPLICATION: ICD-10-CM

## 2025-02-25 ENCOUNTER — TELEPHONE (OUTPATIENT)
Dept: NEUROSURGERY | Facility: CLINIC | Age: 60
End: 2025-02-25

## 2025-02-25 NOTE — TELEPHONE ENCOUNTER
Attempted to return call. No answer, LVM.    ----- Message from Queenie sent at 2/24/2025 12:52 PM CST -----  Regarding: Appt Access  Contact: STEPHANIE OSBORN [[id number]]  SCHEDULING/REQUESTAppt Type:EPDate/Time Preference:Treating Provider: Jenna Caller Name:STEPHANIE OSBORN [[id number]]Contact Preference:[phone] (home)  Comments/Notes: Pt would like schedule a appt for follow up.  Please Call

## 2025-03-17 ENCOUNTER — TELEPHONE (OUTPATIENT)
Dept: NEUROSURGERY | Facility: CLINIC | Age: 60
End: 2025-03-17
Payer: COMMERCIAL

## 2025-03-17 NOTE — TELEPHONE ENCOUNTER
----- Message from Lambert West PA-C sent at 3/14/2025  5:36 PM CDT -----  This patient has a cavernous malformation s/p bleed who Dr. Kinsey would like to get a DSA for. Can yo jeyson facilitate? Thanks!lambert

## 2025-03-24 ENCOUNTER — TELEPHONE (OUTPATIENT)
Dept: NEUROSURGERY | Facility: CLINIC | Age: 60
End: 2025-03-24
Payer: COMMERCIAL

## 2025-03-24 NOTE — TELEPHONE ENCOUNTER
Attempted to call and discuss pt's appt cancellation w/ Esha West PA-C on Weds d/t seeing Dr. Higuera already tmrw for angio. No answer. LVM advising.

## 2025-03-25 ENCOUNTER — OFFICE VISIT (OUTPATIENT)
Dept: NEUROSURGERY | Facility: CLINIC | Age: 60
End: 2025-03-25
Payer: COMMERCIAL

## 2025-03-25 VITALS — DIASTOLIC BLOOD PRESSURE: 99 MMHG | SYSTOLIC BLOOD PRESSURE: 173 MMHG | HEART RATE: 84 BPM

## 2025-03-25 DIAGNOSIS — Q27.30 AVM (ARTERIOVENOUS MALFORMATION): Primary | ICD-10-CM

## 2025-03-25 DIAGNOSIS — Q28.2 ARTERIOVENOUS MALFORMATION OF CEREBRAL VESSELS: Primary | ICD-10-CM

## 2025-03-25 DIAGNOSIS — R51.9 NONINTRACTABLE EPISODIC HEADACHE, UNSPECIFIED HEADACHE TYPE: ICD-10-CM

## 2025-03-25 PROCEDURE — 99999 PR PBB SHADOW E&M-EST. PATIENT-LVL II: CPT | Mod: PBBFAC,,, | Performed by: NEUROLOGICAL SURGERY

## 2025-03-25 PROCEDURE — 99215 OFFICE O/P EST HI 40 MIN: CPT | Mod: S$GLB,,, | Performed by: NEUROLOGICAL SURGERY

## 2025-03-25 RX ORDER — LEVETIRACETAM 500 MG/1
500 TABLET ORAL 2 TIMES DAILY
Qty: 60 TABLET | Refills: 11 | Status: SHIPPED | OUTPATIENT
Start: 2025-03-25 | End: 2026-03-25

## 2025-03-25 NOTE — PROGRESS NOTES
"Neurosurgery  Established Patient    Scribe Attestation  I, Pratima Robbins, attest that this documentation has been prepared under the direction and in the presence of Dhruv Higuera MD.    SUBJECTIVE:     History of Present Illness:  Kelvin Canela is a 60 y/o M, PMhx of HTN, HLD, and T2DM, that presents to me for evaluation of right parietal cavernous malformation. [facility] on 02/02/25. Was found to have newly diagnosed cavernous malformation. The patient was planned to have inpatient angiogram but the procedure was delayed and pt left AMA due to NPO status.     Today he shares when he experienced his first seizure, he had been practicing for a pool league. Developed sudden left foot twitching and "felt off." Patient blacked out and fell. Had no LOC and is able to recall events during that time. Was given Keppra following episodes, but recently ran out. Has not had any seizures since. Currently in clinic, patient is well with no acute changes or issues to report. On insulin (for T2DM) and amlodipine (for HTN). No surgical hx. Positive smoking hx for about 20 years (a pack/3 days). Drinks once a week. NKDA, allergic to shellfish. Works as a  for a liquor store.      Review of patient's allergies indicates:   Allergen Reactions    Shellfish containing products Hives       Current Medications[1]    Past Medical History:   Diagnosis Date    Diabetes mellitus     Diabetes mellitus, type 2      Past Surgical History:   Procedure Laterality Date    I&D rectal abcess  02/14/2018     Family History    None       Social History     Socioeconomic History    Marital status: Single   Tobacco Use    Smoking status: Heavy Smoker     Current packs/day: 1.00     Average packs/day: 1 pack/day for 28.2 years (28.2 ttl pk-yrs)     Types: Cigarettes     Start date: 1/13/1997     Passive exposure: Never    Smokeless tobacco: Never    Tobacco comments:     1 pack a " week   Substance and Sexual Activity    Alcohol use: Yes     Alcohol/week: 1.0 standard drink of alcohol     Types: 1 Cans of beer per week    Drug use: No    Sexual activity: Yes     Birth control/protection: None     Social Drivers of Health     Food Insecurity: No Food Insecurity (2/3/2025)    Received from [facility]    Hunger Vital Sign     Worried About Running Out of Food in the Last Year: Never true     Ran Out of Food in the Last Year: Never true   Transportation Needs: No Transportation Needs (2/3/2025)    Received from [facility]    PRAPARE - Transportation     Lack of Transportation (Medical): No     Lack of Transportation (Non-Medical): No   Housing Stability: Low Risk  (2/3/2025)    Received from [facility]    Housing Stability Vital Sign     Unable to Pay for Housing in the Last Year: No     Number of Times Moved in the Last Year: 0     Homeless in the Last Year: No       Review of Systems   All other systems reviewed and are negative.      OBJECTIVE:     Vital Signs  Pain Score: 0-No pain  There is no height or weight on file to calculate BMI.    Neurosurgery Physical Exam  General: no acute distress  Head: Non-traumatic, normocephalic  Eyes: Pupils equal, EOMI  Neck: Supple, normal ROM, no tenderness to palpation  CVS: Normal rate and rhythm, distal pulses present  Pulm: Symmetric expansion, no respiratory distress  GI: Abdomen nondistended, nontender    MSK: Moves all extremities without restriction, atraumatic  Skin: Dry, intact  Psych: Normal thought content and cognition    Neuro:  Alert, awake, oriented, to self, place, time  Language:  Speech is fluent, goal directed without any noted dysarthria or aphasia    Cranial nerves:    CNII-XII: Intact on fine exam,   Pupils equal round react to light,   Extraocular muscles are intact  V1 to V3 is intact to light touch,   no facial asymmetry,   Hearing is intact to finger rub and voice  tongue/uvula/palate midline,   shoulder shrug equal,     No  pronator drift    Extremities:  Motor:  Upper Extremity    Deltoid Triceps Biceps Wrist  Extension Wrist  Flexion Interosseous   R 5/5 5/5 5/5 5/5 5/5 5/5   L 5/5 5/5 5/5 5/5 5/5 5/5       Thumb   Abduction Thumb  ADDuction Finger  Flexion Finger  Extension     R 5/5 5/5 5/5 5/5     L 5/5 5/5 5/5 5/5        Lower Extremity     Iliopsoas Quadriceps Hamstring Plantarflexion Dorsiflexion EHL   R 5/5 5/5 5/5 5/5 5/5 5/5   L 5/5 5/5 5/5 5/5 5/5 5/5       Reflexes:     DTR: 2+ biceps    2+ triceps   2+ brachioradialis   2+ patellar  2+ Achilles     Gustafson's: Negative     Babinski's: Negative     Clonus: Negative     Sensory:      Sensation intact to light touch, temperature sensation, vibration, pinprick     Coordination:      Coordination intact throughout, no dysmetria, normal rapid alternating movements, no dysdiadochokinesia  Cerebellar:  Normal finger-to-nose, normal heel-to-shin  Cervical spine:  No midline cervical tenderness, negative Lhermitte, negative Spurling  Thoracic spine:  No midline thoracic tenderness  Lumbar spine:  No midline lumbar tenderness     Diagnostic Results:  I personally reviewed the patient's Diagnostic Imaging.     MRI Previous Head [facility] 02/[facility]25  Right parieto-occipital lesion measuring 2.1 cm. This finding likely represents evolutionary changes of subacute blood products. The underlying etiology may relate to vascular malformation. However an underlying neoplastic process such as metastasis is not excluded.     CT Previous (CT Cerebral Perfusion) 02/02/25  Technically adequate study. Symmetric cerebral blood flow and volume bilaterally.    ASSESSMENT/PLAN:   60 y/o M with history of one seizure episode (Feb 2025) and possible right parieto-occipital cavernous malformation.     Patient presents with no focal neurological deficits seen here in clinic. Has had no seizure episodes following first one back in February 2025.    Discussed imaging results of MRI Previous Head (MRI  Brain Stealth) revealing  Right parieto-occipital lesion measuring 2.1 cm. This finding likely represents evolutionary changes of subacute blood products. The underlying etiology may relate to vascular malformation. However an underlying neoplastic process such as metastasis is not excluded.  Discussed imaging results of CT Previous (CT Cerebral Perfusion) revealing technically adequate study. Symmetric cerebral blood flow and volume bilaterally.    After discussion with the patient, I recommend f/u with a repeat MRI Brain with and without contrast to better evaluate morphology of possible cavernous malformation. I also recommend continuing the patient on Keppra 500 mg BID and will order a prescription for them. I have answered all of their questions and patient wish to proceed with this plan. We will schedule patient.      Thank you so very much for allowing me to participate in the care of this patient.  Please feel free to call any questions, comments, or concerns.     Dhruv Higuera MD,MSc  Department of Neurosurgery   Department of Radiology  Department of Neurology  [facility]    [facility]   [employer] [facility] [employer] [employer] [facility]     Total time spent in counseling and discussion about further management options including relevant lab work, treatment,  prognosis, medications and intended side effects was more than 60 minutes. More than 50 % of the time was spent in counseling and coordination of care.  I spent a total of 60 minutes on the day of the visit.This includes face to face time and non-face to face time preparing to see the patient (eg, review of tests), Obtaining and/or reviewing separately obtained history, Documenting clinical information in the electronic or other health record, Independently interpreting resultsand communicating results to the patient/family/caregiver, or Care coordination.      Scribe Attestation  I, Dr.Vernard Higuera personally performed the services described in this documentation. All medical record entries made by the scribe, Pratima Robbins, were at my direction and in my presence.  I have reviewed the chart and agree that the record reflects my personal performance and is accurate and complete.           [1]   Current Outpatient Medications   Medication Sig Dispense Refill    amLODIPine (NORVASC) 5 MG tablet Take 1 tablet (5 mg total) by mouth once daily. 30 tablet 5    atorvastatin (LIPITOR) 40 MG tablet Take 1 tablet (40 mg total) by mouth every evening. For cholesterol 90 tablet 3    BLOOD PRESSURE CUFF Misc 1 each by Misc.(Non-Drug; Combo Route) route once daily. 1 each 0    blood sugar diagnostic Strp To check BG 3 times daily, to use with insurance preferred meter 100 each 2    blood sugar diagnostic Strp To check Blood Glucose 3 times daily, to use with insurance preferred meter 100 each 2    blood-glucose meter kit To check BG 1 time daily, to use with insurance preferred meter 1 each 0    blood-glucose meter Misc Test blood sugar 3 (three) times daily. 1 each 0    insulin (BASAGLAR KWIKPEN U-100 INSULIN) glargine 100 units/mL SubQ pen Inject 12 Units into the skin every evening. 6 mL 2    lancets Misc To check BG 1 time daily, to use with insurance preferred meter 100 each 0    lancets Misc To check Blood Glucose 3 times daily, to use with insurance preferred meter 100 each 2    levETIRAcetam (KEPPRA) 500 MG Tab Take 1 tablet (500 mg total) by mouth 2 (two) times daily. 60 tablet 1    metFORMIN (GLUCOPHAGE-XR) 500 MG ER 24hr tablet Take 2 tablets (1,000 mg total) by mouth 2 (two) times daily with meals. 360 tablet 3    methocarbamoL (ROBAXIN) 750 MG Tab Take 750 mg by mouth.      naproxen (NAPROSYN) 500 MG tablet Take 1 tablet (500 mg total) by mouth 2 (two) times daily as needed (take with food as needed for back pain). 30 tablet 1    oxyCODONE (ROXICODONE) 5 MG immediate  "release tablet Take 5 mg by mouth every 6 (six) hours as needed.      pen needle, diabetic (PEN NEEDLE) 31 gauge x 5/16" Ndle 1 each by Misc.(Non-Drug; Combo Route) route once daily. 100 each 1    sildenafiL (VIAGRA) 50 MG tablet Take 1 tablet (50 mg total) by mouth daily as needed for Erectile Dysfunction. 50 mg once daily as needed 1 hour before sexual activity; may be taken up to 4 hours before sexual activity. Reduce to 25 mg once daily if side effects occur 30 tablet 3     No current facility-administered medications for this visit.     "

## 2025-03-26 ENCOUNTER — TELEPHONE (OUTPATIENT)
Dept: NEUROSURGERY | Facility: CLINIC | Age: 60
End: 2025-03-26
Payer: COMMERCIAL

## 2025-04-16 ENCOUNTER — TELEPHONE (OUTPATIENT)
Dept: NEUROSURGERY | Facility: CLINIC | Age: 60
End: 2025-04-16
Payer: COMMERCIAL

## 2025-04-16 NOTE — TELEPHONE ENCOUNTER
Called pt 3x. Lvm informing pt's insurance is out of next work. Lvm with clinic number to call back.

## 2025-04-16 NOTE — TELEPHONE ENCOUNTER
Bryant Álvarez Staff  [facility] ins is oon with [facility].Mri is denied. Thanks.                                                            Bryant Álvarez

## 2025-05-07 DIAGNOSIS — E11.9 TYPE 2 DIABETES MELLITUS WITHOUT COMPLICATION: ICD-10-CM

## 2025-05-21 ENCOUNTER — TELEPHONE (OUTPATIENT)
Dept: NEUROSURGERY | Facility: CLINIC | Age: 60
End: 2025-05-21
Payer: COMMERCIAL

## 2025-05-21 NOTE — TELEPHONE ENCOUNTER
"Attempted to call 3x. Automated voice mail "the person you are calling cannot accept calls at this time"  "

## 2025-05-22 NOTE — TELEPHONE ENCOUNTER
"Attempted to call 2x. Automated voice mail "the person you are calling cannot accept calls at this time"  "

## 2025-05-22 NOTE — TELEPHONE ENCOUNTER
Called the significant other. Got in touch with the pt. Cancelled appt on 5/27/2025. Scheduled MRI appt. Pt is aware that his insurance and still want to be scheduled for the MRI. Scheduled MRI appt.

## 2025-06-20 ENCOUNTER — TELEPHONE (OUTPATIENT)
Dept: NEUROSURGERY | Facility: CLINIC | Age: 60
End: 2025-06-20

## 2025-06-20 NOTE — TELEPHONE ENCOUNTER
Called pt. Lvm informed that appt on 6/23/2025 is canceled due to insufficient imaging. Lvm with clinic number to call back to reschedule.

## 2025-07-15 ENCOUNTER — TELEPHONE (OUTPATIENT)
Dept: PRIMARY CARE CLINIC | Facility: CLINIC | Age: 60
End: 2025-07-15

## 2025-07-15 NOTE — TELEPHONE ENCOUNTER
Copied from CRM #[id number]. Topic: General Inquiry - Patient Advice  >> Jul 15, 2025  4:07 PM Kelly wrote:  .1MEDICALADVICE     Patient is calling for Medical Advice regarding: Pt said he needs a call to get a list of meds     How long has patient had these symptoms:    Pharmacy name and phone#:    Patient wants a call back or thru [facility], provide patient's call back phone number:[phone]    Comments:    Please advise patient replies from provider may take up to 48 hours.

## 2025-07-15 NOTE — TELEPHONE ENCOUNTER
Called patient to schedule an appointment for med check/ refill but erica was out shopping and said she would like to call back to get scheduled to come in to be seen    Copied from CRM #[id number]. Topic: General Inquiry - Patient Advice  >> Jul 15, 2025  4:07 PM Kelly wrote:  .1MEDICALADVICE     Patient is calling for Medical Advice regarding: Pt said he needs a call to get a list of meds     How long has patient had these symptoms:    Pharmacy name and phone#:    Patient wants a call back or thru [facility], provide patient's call back phone number:[phone]    Comments:    Please advise patient replies from provider may take up to 48 hours.

## 2025-07-15 NOTE — TELEPHONE ENCOUNTER
Call and spoke with patient in regards for his request for a list of medication. Patient was ask if he had access to his patient portal and he stated no. Then I let patient know that he can come in and put up a list of his medication at [facility] [place] location and patient verbally understood.

## 2025-08-29 ENCOUNTER — PATIENT MESSAGE (OUTPATIENT)
Dept: ADMINISTRATIVE | Facility: HOSPITAL | Age: 60
End: 2025-08-29